# Patient Record
Sex: MALE | Race: OTHER | HISPANIC OR LATINO
[De-identification: names, ages, dates, MRNs, and addresses within clinical notes are randomized per-mention and may not be internally consistent; named-entity substitution may affect disease eponyms.]

---

## 2017-01-24 ENCOUNTER — FORM ENCOUNTER (OUTPATIENT)
Age: 52
End: 2017-01-24

## 2017-01-25 ENCOUNTER — OUTPATIENT (OUTPATIENT)
Dept: OUTPATIENT SERVICES | Facility: HOSPITAL | Age: 52
LOS: 1 days | End: 2017-01-25
Payer: MEDICAID

## 2017-01-25 ENCOUNTER — APPOINTMENT (OUTPATIENT)
Dept: RADIOLOGY | Facility: CLINIC | Age: 52
End: 2017-01-25

## 2017-01-25 DIAGNOSIS — M25.511 PAIN IN RIGHT SHOULDER: ICD-10-CM

## 2017-01-25 PROCEDURE — 73030 X-RAY EXAM OF SHOULDER: CPT

## 2017-01-30 ENCOUNTER — APPOINTMENT (OUTPATIENT)
Dept: FAMILY MEDICINE | Facility: CLINIC | Age: 52
End: 2017-01-30

## 2017-01-30 VITALS
SYSTOLIC BLOOD PRESSURE: 129 MMHG | TEMPERATURE: 97.8 F | HEIGHT: 67 IN | HEART RATE: 73 BPM | DIASTOLIC BLOOD PRESSURE: 75 MMHG | BODY MASS INDEX: 25.58 KG/M2 | OXYGEN SATURATION: 97 % | WEIGHT: 163 LBS

## 2017-01-30 DIAGNOSIS — Z78.9 OTHER SPECIFIED HEALTH STATUS: ICD-10-CM

## 2017-02-05 ENCOUNTER — FORM ENCOUNTER (OUTPATIENT)
Age: 52
End: 2017-02-05

## 2017-02-06 ENCOUNTER — OUTPATIENT (OUTPATIENT)
Dept: OUTPATIENT SERVICES | Facility: HOSPITAL | Age: 52
LOS: 1 days | End: 2017-02-06
Payer: MEDICAID

## 2017-02-06 ENCOUNTER — APPOINTMENT (OUTPATIENT)
Dept: MRI IMAGING | Facility: CLINIC | Age: 52
End: 2017-02-06

## 2017-02-06 DIAGNOSIS — M25.511 PAIN IN RIGHT SHOULDER: ICD-10-CM

## 2017-02-06 PROCEDURE — 73221 MRI JOINT UPR EXTREM W/O DYE: CPT

## 2017-05-02 ENCOUNTER — APPOINTMENT (OUTPATIENT)
Dept: FAMILY MEDICINE | Facility: CLINIC | Age: 52
End: 2017-05-02

## 2017-05-02 VITALS
SYSTOLIC BLOOD PRESSURE: 144 MMHG | HEIGHT: 67 IN | HEART RATE: 75 BPM | DIASTOLIC BLOOD PRESSURE: 80 MMHG | TEMPERATURE: 97.9 F | BODY MASS INDEX: 25.74 KG/M2 | WEIGHT: 164 LBS | OXYGEN SATURATION: 97 %

## 2017-08-02 ENCOUNTER — APPOINTMENT (OUTPATIENT)
Dept: FAMILY MEDICINE | Facility: CLINIC | Age: 52
End: 2017-08-02

## 2017-08-09 ENCOUNTER — APPOINTMENT (OUTPATIENT)
Dept: FAMILY MEDICINE | Facility: CLINIC | Age: 52
End: 2017-08-09
Payer: MEDICAID

## 2017-08-09 VITALS
HEART RATE: 71 BPM | DIASTOLIC BLOOD PRESSURE: 82 MMHG | WEIGHT: 165 LBS | BODY MASS INDEX: 25.9 KG/M2 | HEIGHT: 67 IN | OXYGEN SATURATION: 96 % | TEMPERATURE: 97.6 F | SYSTOLIC BLOOD PRESSURE: 136 MMHG

## 2017-08-09 DIAGNOSIS — R74.8 ABNORMAL LEVELS OF OTHER SERUM ENZYMES: ICD-10-CM

## 2017-08-09 DIAGNOSIS — Z87.898 PERSONAL HISTORY OF OTHER SPECIFIED CONDITIONS: ICD-10-CM

## 2017-08-09 DIAGNOSIS — E11.65 TYPE 2 DIABETES MELLITUS WITH HYPERGLYCEMIA: ICD-10-CM

## 2017-08-09 DIAGNOSIS — D72.819 DECREASED WHITE BLOOD CELL COUNT, UNSPECIFIED: ICD-10-CM

## 2017-08-09 PROCEDURE — 99214 OFFICE O/P EST MOD 30 MIN: CPT

## 2017-12-11 ENCOUNTER — APPOINTMENT (OUTPATIENT)
Dept: FAMILY MEDICINE | Facility: CLINIC | Age: 52
End: 2017-12-11
Payer: MEDICAID

## 2017-12-11 VITALS
WEIGHT: 161 LBS | BODY MASS INDEX: 25.27 KG/M2 | OXYGEN SATURATION: 97 % | DIASTOLIC BLOOD PRESSURE: 74 MMHG | HEART RATE: 71 BPM | HEIGHT: 67 IN | SYSTOLIC BLOOD PRESSURE: 166 MMHG | TEMPERATURE: 97.9 F

## 2017-12-11 VITALS — DIASTOLIC BLOOD PRESSURE: 90 MMHG | SYSTOLIC BLOOD PRESSURE: 160 MMHG

## 2017-12-11 DIAGNOSIS — Z87.09 PERSONAL HISTORY OF OTHER DISEASES OF THE RESPIRATORY SYSTEM: ICD-10-CM

## 2017-12-11 DIAGNOSIS — Z86.39 PERSONAL HISTORY OF OTHER ENDOCRINE, NUTRITIONAL AND METABOLIC DISEASE: ICD-10-CM

## 2017-12-11 DIAGNOSIS — H61.22 IMPACTED CERUMEN, LEFT EAR: ICD-10-CM

## 2017-12-11 DIAGNOSIS — I10 ESSENTIAL (PRIMARY) HYPERTENSION: ICD-10-CM

## 2017-12-11 DIAGNOSIS — N52.9 MALE ERECTILE DYSFUNCTION, UNSPECIFIED: ICD-10-CM

## 2017-12-11 PROCEDURE — 93000 ELECTROCARDIOGRAM COMPLETE: CPT

## 2017-12-11 PROCEDURE — 99396 PREV VISIT EST AGE 40-64: CPT | Mod: 25

## 2017-12-11 PROCEDURE — 36415 COLL VENOUS BLD VENIPUNCTURE: CPT

## 2017-12-11 PROCEDURE — 99213 OFFICE O/P EST LOW 20 MIN: CPT | Mod: 25

## 2017-12-11 RX ORDER — RAMIPRIL 1.25 MG/1
1.25 CAPSULE ORAL DAILY
Qty: 90 | Refills: 1 | Status: DISCONTINUED | COMMUNITY
Start: 2017-05-02 | End: 2017-12-11

## 2017-12-17 ENCOUNTER — RX RENEWAL (OUTPATIENT)
Age: 52
End: 2017-12-17

## 2017-12-17 ENCOUNTER — OTHER (OUTPATIENT)
Age: 52
End: 2017-12-17

## 2017-12-17 LAB
25(OH)D3 SERPL-MCNC: 21.3 NG/ML
ALBUMIN SERPL ELPH-MCNC: 4.5 G/DL
ALP BLD-CCNC: 70 U/L
ALT SERPL-CCNC: 69 U/L
ANION GAP SERPL CALC-SCNC: 10 MMOL/L
APPEARANCE: CLEAR
AST SERPL-CCNC: 39 U/L
BACTERIA: NEGATIVE
BASOPHILS # BLD AUTO: 0.05 K/UL
BASOPHILS NFR BLD AUTO: 0.5 %
BILIRUB SERPL-MCNC: 0.4 MG/DL
BILIRUBIN URINE: NEGATIVE
BLOOD URINE: NEGATIVE
BUN SERPL-MCNC: 18 MG/DL
CALCIUM SERPL-MCNC: 9.5 MG/DL
CHLORIDE SERPL-SCNC: 101 MMOL/L
CHOLEST SERPL-MCNC: 141 MG/DL
CHOLEST/HDLC SERPL: 4 RATIO
CO2 SERPL-SCNC: 29 MMOL/L
COLOR: YELLOW
CREAT SERPL-MCNC: 1.17 MG/DL
CREAT SPEC-SCNC: 69 MG/DL
EOSINOPHIL # BLD AUTO: 0.9 K/UL
EOSINOPHIL NFR BLD AUTO: 8.6 %
GLUCOSE QUALITATIVE U: NEGATIVE MG/DL
GLUCOSE SERPL-MCNC: 139 MG/DL
HBA1C MFR BLD HPLC: 7.4 %
HCT VFR BLD CALC: 43.9 %
HDLC SERPL-MCNC: 35 MG/DL
HGB BLD-MCNC: 14.9 G/DL
HIV1+2 AB SPEC QL IA.RAPID: NONREACTIVE
IMM GRANULOCYTES NFR BLD AUTO: 0.2 %
KETONES URINE: NEGATIVE
LDLC SERPL CALC-MCNC: 67 MG/DL
LEUKOCYTE ESTERASE URINE: NEGATIVE
LYMPHOCYTES # BLD AUTO: 3.27 K/UL
LYMPHOCYTES NFR BLD AUTO: 31.1 %
MAN DIFF?: NORMAL
MCHC RBC-ENTMCNC: 28.7 PG
MCHC RBC-ENTMCNC: 33.9 GM/DL
MCV RBC AUTO: 84.6 FL
MICROALBUMIN 24H UR DL<=1MG/L-MCNC: 12.2 MG/DL
MICROALBUMIN/CREAT 24H UR-RTO: 177 MG/G
MICROSCOPIC-UA: NORMAL
MONOCYTES # BLD AUTO: 0.67 K/UL
MONOCYTES NFR BLD AUTO: 6.4 %
NEUTROPHILS # BLD AUTO: 5.61 K/UL
NEUTROPHILS NFR BLD AUTO: 53.2 %
NITRITE URINE: NEGATIVE
PH URINE: 5.5
PLATELET # BLD AUTO: 256 K/UL
POTASSIUM SERPL-SCNC: 5.1 MMOL/L
PROT SERPL-MCNC: 8.6 G/DL
PROTEIN URINE: ABNORMAL MG/DL
PSA SERPL-MCNC: 0.91 NG/ML
RBC # BLD: 5.19 M/UL
RBC # FLD: 12.2 %
RED BLOOD CELLS URINE: 0 /HPF
SODIUM SERPL-SCNC: 140 MMOL/L
SPECIFIC GRAVITY URINE: 1.01
SQUAMOUS EPITHELIAL CELLS: 0 /HPF
T4 FREE SERPL-MCNC: 1.4 NG/DL
TESTOST BND SERPL-MCNC: 5.8 PG/ML
TESTOST SERPL-MCNC: 297.3 NG/DL
TRIGL SERPL-MCNC: 193 MG/DL
TSH SERPL-ACNC: 1.21 UIU/ML
UROBILINOGEN URINE: NEGATIVE MG/DL
WBC # FLD AUTO: 10.52 K/UL
WHITE BLOOD CELLS URINE: 0 /HPF

## 2017-12-28 ENCOUNTER — CHART COPY (OUTPATIENT)
Age: 52
End: 2017-12-28

## 2018-01-30 ENCOUNTER — APPOINTMENT (OUTPATIENT)
Dept: FAMILY MEDICINE | Facility: CLINIC | Age: 53
End: 2018-01-30
Payer: MEDICAID

## 2018-01-30 VITALS
OXYGEN SATURATION: 96 % | HEIGHT: 67 IN | SYSTOLIC BLOOD PRESSURE: 151 MMHG | WEIGHT: 165 LBS | BODY MASS INDEX: 25.9 KG/M2 | HEART RATE: 68 BPM | TEMPERATURE: 97.6 F | DIASTOLIC BLOOD PRESSURE: 78 MMHG

## 2018-01-30 VITALS — DIASTOLIC BLOOD PRESSURE: 80 MMHG | SYSTOLIC BLOOD PRESSURE: 120 MMHG

## 2018-01-30 DIAGNOSIS — Z86.39 PERSONAL HISTORY OF OTHER ENDOCRINE, NUTRITIONAL AND METABOLIC DISEASE: ICD-10-CM

## 2018-01-30 PROCEDURE — 99214 OFFICE O/P EST MOD 30 MIN: CPT

## 2018-01-30 RX ORDER — AMLODIPINE BESYLATE 5 MG/1
5 TABLET ORAL DAILY
Qty: 30 | Refills: 5 | Status: DISCONTINUED | COMMUNITY
Start: 2017-12-11 | End: 2018-01-30

## 2018-01-31 PROBLEM — Z86.39 HISTORY OF DIABETES MELLITUS: Status: RESOLVED | Noted: 2017-08-09 | Resolved: 2018-01-31

## 2018-02-02 ENCOUNTER — APPOINTMENT (OUTPATIENT)
Dept: CARDIOLOGY | Facility: CLINIC | Age: 53
End: 2018-02-02
Payer: MEDICAID

## 2018-02-02 VITALS
HEIGHT: 67 IN | BODY MASS INDEX: 25.27 KG/M2 | WEIGHT: 161 LBS | SYSTOLIC BLOOD PRESSURE: 128 MMHG | HEART RATE: 60 BPM | DIASTOLIC BLOOD PRESSURE: 86 MMHG

## 2018-02-02 DIAGNOSIS — Z86.79 PERSONAL HISTORY OF OTHER DISEASES OF THE CIRCULATORY SYSTEM: ICD-10-CM

## 2018-02-02 PROCEDURE — 99204 OFFICE O/P NEW MOD 45 MIN: CPT

## 2018-02-02 PROCEDURE — 93000 ELECTROCARDIOGRAM COMPLETE: CPT

## 2018-02-02 RX ORDER — NAPROXEN 375 MG/1
375 TABLET ORAL
Qty: 60 | Refills: 0 | Status: DISCONTINUED | COMMUNITY
Start: 2017-01-30 | End: 2018-02-02

## 2018-02-02 RX ORDER — MONTELUKAST 10 MG/1
10 TABLET, FILM COATED ORAL
Qty: 90 | Refills: 1 | Status: DISCONTINUED | COMMUNITY
Start: 2017-05-02 | End: 2018-02-02

## 2018-02-07 ENCOUNTER — APPOINTMENT (OUTPATIENT)
Dept: OPHTHALMOLOGY | Facility: CLINIC | Age: 53
End: 2018-02-07

## 2018-02-13 ENCOUNTER — APPOINTMENT (OUTPATIENT)
Dept: CARDIOLOGY | Facility: CLINIC | Age: 53
End: 2018-02-13
Payer: MEDICAID

## 2018-02-13 PROCEDURE — A9502: CPT

## 2018-02-13 PROCEDURE — 93306 TTE W/DOPPLER COMPLETE: CPT

## 2018-02-13 PROCEDURE — 78452 HT MUSCLE IMAGE SPECT MULT: CPT

## 2018-02-13 PROCEDURE — 93015 CV STRESS TEST SUPVJ I&R: CPT

## 2018-02-16 ENCOUNTER — APPOINTMENT (OUTPATIENT)
Dept: CARDIOLOGY | Facility: CLINIC | Age: 53
End: 2018-02-16
Payer: MEDICAID

## 2018-02-16 VITALS
HEART RATE: 70 BPM | WEIGHT: 162 LBS | BODY MASS INDEX: 25.43 KG/M2 | HEIGHT: 67 IN | SYSTOLIC BLOOD PRESSURE: 108 MMHG | OXYGEN SATURATION: 98 % | RESPIRATION RATE: 16 BRPM | DIASTOLIC BLOOD PRESSURE: 72 MMHG

## 2018-02-16 PROCEDURE — 99214 OFFICE O/P EST MOD 30 MIN: CPT

## 2018-04-02 ENCOUNTER — APPOINTMENT (OUTPATIENT)
Dept: INTERNAL MEDICINE | Facility: CLINIC | Age: 53
End: 2018-04-02

## 2018-07-22 ENCOUNTER — RX RENEWAL (OUTPATIENT)
Age: 53
End: 2018-07-22

## 2018-10-17 ENCOUNTER — RX RENEWAL (OUTPATIENT)
Age: 53
End: 2018-10-17

## 2018-11-08 ENCOUNTER — APPOINTMENT (OUTPATIENT)
Dept: INTERNAL MEDICINE | Facility: CLINIC | Age: 53
End: 2018-11-08

## 2018-11-30 ENCOUNTER — APPOINTMENT (OUTPATIENT)
Dept: INTERNAL MEDICINE | Facility: CLINIC | Age: 53
End: 2018-11-30
Payer: MEDICAID

## 2018-11-30 VITALS
SYSTOLIC BLOOD PRESSURE: 122 MMHG | TEMPERATURE: 97.7 F | OXYGEN SATURATION: 98 % | WEIGHT: 160 LBS | BODY MASS INDEX: 25.11 KG/M2 | HEART RATE: 64 BPM | DIASTOLIC BLOOD PRESSURE: 78 MMHG | HEIGHT: 67 IN

## 2018-11-30 DIAGNOSIS — R68.82 DECREASED LIBIDO: ICD-10-CM

## 2018-11-30 DIAGNOSIS — H10.10 ACUTE ATOPIC CONJUNCTIVITIS, UNSPECIFIED EYE: ICD-10-CM

## 2018-11-30 DIAGNOSIS — Z87.898 PERSONAL HISTORY OF OTHER SPECIFIED CONDITIONS: ICD-10-CM

## 2018-11-30 DIAGNOSIS — R39.12 POOR URINARY STREAM: ICD-10-CM

## 2018-11-30 DIAGNOSIS — R80.9 PROTEINURIA, UNSPECIFIED: ICD-10-CM

## 2018-11-30 DIAGNOSIS — M25.511 PAIN IN RIGHT SHOULDER: ICD-10-CM

## 2018-11-30 DIAGNOSIS — Z87.19 PERSONAL HISTORY OF OTHER DISEASES OF THE DIGESTIVE SYSTEM: ICD-10-CM

## 2018-11-30 DIAGNOSIS — Z87.09 PERSONAL HISTORY OF OTHER DISEASES OF THE RESPIRATORY SYSTEM: ICD-10-CM

## 2018-11-30 DIAGNOSIS — Z23 ENCOUNTER FOR IMMUNIZATION: ICD-10-CM

## 2018-11-30 PROCEDURE — 90715 TDAP VACCINE 7 YRS/> IM: CPT

## 2018-11-30 PROCEDURE — 99214 OFFICE O/P EST MOD 30 MIN: CPT | Mod: 25

## 2018-11-30 PROCEDURE — 90471 IMMUNIZATION ADMIN: CPT

## 2018-11-30 RX ORDER — LOSARTAN POTASSIUM 25 MG/1
25 TABLET, FILM COATED ORAL DAILY
Qty: 30 | Refills: 5 | Status: DISCONTINUED | COMMUNITY
Start: 2018-01-30 | End: 2018-11-30

## 2018-11-30 RX ORDER — MULTIVIT-MIN/FOLIC/VIT K/LYCOP 400-300MCG
25 MCG TABLET ORAL DAILY
Qty: 90 | Refills: 1 | Status: DISCONTINUED | COMMUNITY
Start: 2017-12-17 | End: 2018-11-30

## 2018-11-30 RX ORDER — OMEPRAZOLE 20 MG/1
20 CAPSULE, DELAYED RELEASE ORAL
Qty: 30 | Refills: 3 | Status: DISCONTINUED | COMMUNITY
Start: 2018-07-22 | End: 2018-11-30

## 2018-11-30 NOTE — HISTORY OF PRESENT ILLNESS
[de-identified] : Here for follow up\par \par He stopped taking atorvastatin.  He states it made him feel weak and not normal\par \par He stopped taking his losartan as well\par \par FS this morning at home 132.  He is only taking metformin 1000mg Po daily\par \par He states he feels well and denies any compalints

## 2018-11-30 NOTE — PHYSICAL EXAM
[General Appearance - Alert] : alert [General Appearance - In No Acute Distress] : in no acute distress [Sclera] : the sclera and conjunctiva were normal [PERRL With Normal Accommodation] : pupils were equal in size, round, and reactive to light [Oropharynx] : the oropharynx was normal [Neck Appearance] : the appearance of the neck was normal [Jugular Venous Distention Increased] : there was no jugular-venous distention [Auscultation Breath Sounds / Voice Sounds] : lungs were clear to auscultation bilaterally [Heart Rate And Rhythm] : heart rate was normal and rhythm regular [Heart Sounds] : normal S1 and S2 [Heart Sounds Gallop] : no gallops [Murmurs] : no murmurs [Heart Sounds Pericardial Friction Rub] : no pericardial rub [Edema] : there was no peripheral edema [Bowel Sounds] : normal bowel sounds [Abdomen Soft] : soft [Abdomen Tenderness] : non-tender [Abdomen Mass (___ Cm)] : no abdominal mass palpated [Musculoskeletal - Swelling] : no joint swelling seen [] : no rash [No Focal Deficits] : no focal deficits [Oriented To Time, Place, And Person] : oriented to person, place, and time [Affect] : the affect was normal [Mood] : the mood was normal [Arterial Pulses Carotid] : carotid pulses were normal with no bruits [FreeTextEntry1] : no calf tenderness [Right Foot Was Examined] : right foot was examined [Left Foot Was Examined] : left foot was examined [Normal Appearance] : normal in appearance [Tenderness] : not tender [Erythema] : not erythematous [Normal in Appearance] : normal in appearance [Swelling] : not swollen [Diminished Throughout Right Foot] : normal tactile sensation with monofilament testing throughout right foot [Diminished Throughout Left Foot] : normal tactile sensation with monofilament testing throughout left foot [Cranial Nerves] : cranial nerves 2-12 were intact [Motor Exam] : the motor exam was normal

## 2018-11-30 NOTE — ASSESSMENT
[FreeTextEntry1] : \par DM type 2:-HgA1c  7.4 2017-will check fasting labs\par -on metformin 1000mg PO daily\par --I adv low fat/low cholesterol diet\par -urine A:C 177-he could not tolerate ACEI because it made him a little dizzy-he had been on losaratn but he self d/c it.  Will check A:C\par -Opthalmology-2018 + DM retinopathy\par -Podiatry-saw 10/2016\par -Foot exam: 2018\par -referred to Endocrine but he never went\par \par Glaucoma/ DM retinopathy:\par -followed by ophthalmology\par \par Dyslipidemia:\par -he self d/c statin\par -benefits of statin discussed with pt at length\par -I adv low far/low cholesterol diet\par -fasting labs ordered\par \par HTN:\par -BP at goal today on no medication\par \par Vitamin D deficiency:\par -will check vitamin D 25-OH\par \par Elevated LFTS:\par -he has fatty liver\par -will check labs\par \par GERD:\par -he is asymptomatic off meds\par \par HCM:\par \par CPE 2017\par \par Flu shot: patient refused 2018\par \par Pneumovax: pt refused 10/11/2016\par \par Tdap: advised.  R/B discussed.  VIS given.  Tdap given today 2018\par \par HIV testing: negative 2017-offered today 2018 he declined\par \par Hep C screening: negative 2014\par \par hep B: not immune-advised immunization previously\par \par PSA 0.91 2017-will check PSA\par \par Depression screenin2018 negative (PHQ 2 score 0)\par \par Colonoscopy: he states he had colonoscopy done in  and it was normal\par \par \par F/U 3 months  fasting labs ordered\par

## 2018-11-30 NOTE — REVIEW OF SYSTEMS
[Negative] : Psychiatric [Fever] : no fever [Chills] : no chills [Feeling Poorly] : not feeling poorly [Feeling Tired] : not feeling tired [Recent Weight Loss (___ Lbs)] : no recent weight loss [Chest Pain] : no chest pain [Palpitations] : no palpitations [Lower Ext Edema] : no extremity edema [Shortness Of Breath] : no shortness of breath [Cough] : no cough [Wheezing] : no wheezing [SOB on Exertion] : no shortness of breath during exertion [Abdominal Pain] : no abdominal pain [Vomiting] : no vomiting [Constipation] : no constipation [Diarrhea] : no diarrhea [Heartburn] : no heartburn [Dizziness] : no dizziness

## 2019-01-27 ENCOUNTER — RX RENEWAL (OUTPATIENT)
Age: 54
End: 2019-01-27

## 2019-02-15 ENCOUNTER — APPOINTMENT (OUTPATIENT)
Dept: CARDIOLOGY | Facility: CLINIC | Age: 54
End: 2019-02-15

## 2019-02-26 ENCOUNTER — APPOINTMENT (OUTPATIENT)
Dept: INTERNAL MEDICINE | Facility: CLINIC | Age: 54
End: 2019-02-26
Payer: MEDICAID

## 2019-02-26 VITALS
OXYGEN SATURATION: 98 % | WEIGHT: 162 LBS | BODY MASS INDEX: 25.43 KG/M2 | TEMPERATURE: 97.5 F | SYSTOLIC BLOOD PRESSURE: 124 MMHG | DIASTOLIC BLOOD PRESSURE: 74 MMHG | HEART RATE: 73 BPM | HEIGHT: 67 IN | RESPIRATION RATE: 15 BRPM

## 2019-02-26 LAB
GLUCOSE BLDC GLUCOMTR-MCNC: 357
HBA1C MFR BLD HPLC: 9.5

## 2019-02-26 PROCEDURE — 36415 COLL VENOUS BLD VENIPUNCTURE: CPT

## 2019-02-26 PROCEDURE — 82962 GLUCOSE BLOOD TEST: CPT

## 2019-02-26 PROCEDURE — 83036 HEMOGLOBIN GLYCOSYLATED A1C: CPT | Mod: QW

## 2019-02-26 PROCEDURE — 99214 OFFICE O/P EST MOD 30 MIN: CPT | Mod: 25

## 2019-02-26 NOTE — REVIEW OF SYSTEMS
[Negative] : Genitourinary [Fever] : no fever [Chills] : no chills [Feeling Poorly] : not feeling poorly [Feeling Tired] : not feeling tired [Recent Weight Loss (___ Lbs)] : no recent weight loss [Eyesight Problems] : no eyesight problems [Chest Pain] : no chest pain [Palpitations] : no palpitations [Lower Ext Edema] : no extremity edema [Shortness Of Breath] : no shortness of breath [Cough] : no cough [Wheezing] : no wheezing [SOB on Exertion] : no shortness of breath during exertion [Abdominal Pain] : no abdominal pain [Vomiting] : no vomiting [Constipation] : no constipation [Diarrhea] : no diarrhea [Heartburn] : no heartburn [Dizziness] : no dizziness

## 2019-02-26 NOTE — HISTORY OF PRESENT ILLNESS
[de-identified] : Here for follow up of DM\par \par He states he feels well and denies any complaints

## 2019-02-26 NOTE — ASSESSMENT
[FreeTextEntry1] : \par DM type 2: uncontrolled\par -FS today 357 2 hours after eating chicken with rice\par -HgA1c 2019 9.5\par -he admits to non compliance with diet\par -on metformin 1000mg PO twice daily\par -I Will add januvia 100mg Po daily  (R/B/A/side effects discussed)-no hx of pancreatitis and no family or personal hx of thyroid cancer\par -I adv low fat/low cholesterol diet and low carb\par -urine A:C 1090 2018-he could not tolerate ACEI/RONALD because it made him a little dizzy  Will check A:C\par -Opthalmology-2018 + DM retinopathy\par -Podiatry-saw 10/2016\par -Foot exam: 2018\par -referred to Endocrine again today-he states he needs Endodrine in the [facility]-will refer to Dr Kruger\par -he is to check FS BID and call me in one week to discus numbers\par \par Glaucoma/ DM retinopathy:\par -followed by ophthalmology\par \par Dyslipidemia:\par -on atorvastatin\par -will check lipids at next visit as he is not fasting\par -I adv low far/low cholesterol diet\par \par HTN:\par -BP at goal today on no medication\par \par Vitamin D deficiency:\par -will check vitamin D 25-OH\par \par Elevated LFTS:\par -he has fatty liver\par -will check CMP\par \par Low testosterone:\par -will repeat labs and check prolactin, LH/FSH\par \par \par HCM:\par \par CPE 2017\par \par Flu shot: patient refused 2018\par \par Pneumovax: pt refused 10/11/2016\par \par Tdap: 2018\par \par HIV testing: negative 2017-offered 2018 he declined\par \par Hep C screening: negative 2014\par \par hep B: not immune-advised immunization previously\par \par PSA 0.62 2018\par \par Depression screenin2018 negative (PHQ 2 score 0)\par \par Colonoscopy: he states he had colonoscopy done in 2016 and it was normal\par \par \par F/U 3 months.  Labs drawn in office today.  Will refer to Care management\par

## 2019-03-03 LAB
25(OH)D3 SERPL-MCNC: 26.1 NG/ML
ALBUMIN SERPL ELPH-MCNC: 4.1 G/DL
ALP BLD-CCNC: 77 U/L
ALT SERPL-CCNC: 52 U/L
ANION GAP SERPL CALC-SCNC: 11 MMOL/L
APPEARANCE: CLEAR
AST SERPL-CCNC: 32 U/L
BACTERIA: NEGATIVE
BASOPHILS # BLD AUTO: 0.04 K/UL
BASOPHILS NFR BLD AUTO: 0.5 %
BILIRUB SERPL-MCNC: 0.3 MG/DL
BILIRUBIN URINE: NEGATIVE
BLOOD URINE: NEGATIVE
BUN SERPL-MCNC: 18 MG/DL
CALCIUM SERPL-MCNC: 9.4 MG/DL
CHLORIDE SERPL-SCNC: 102 MMOL/L
CO2 SERPL-SCNC: 26 MMOL/L
COLOR: NORMAL
CREAT SERPL-MCNC: 1.02 MG/DL
CREAT SPEC-SCNC: 102 MG/DL
EOSINOPHIL # BLD AUTO: 0.66 K/UL
EOSINOPHIL NFR BLD AUTO: 7.8 %
FSH SERPL-MCNC: 6.2 IU/L
GLUCOSE QUALITATIVE U: ABNORMAL
GLUCOSE SERPL-MCNC: 309 MG/DL
HCT VFR BLD CALC: 44.7 %
HGB BLD-MCNC: 14.9 G/DL
HYALINE CASTS: 0 /LPF
IMM GRANULOCYTES NFR BLD AUTO: 0.1 %
KETONES URINE: NEGATIVE
LEUKOCYTE ESTERASE URINE: NEGATIVE
LH SERPL-ACNC: 5.5 IU/L
LYMPHOCYTES # BLD AUTO: 2.49 K/UL
LYMPHOCYTES NFR BLD AUTO: 29.4 %
MAN DIFF?: NORMAL
MCHC RBC-ENTMCNC: 28 PG
MCHC RBC-ENTMCNC: 33.3 GM/DL
MCV RBC AUTO: 84 FL
MICROALBUMIN 24H UR DL<=1MG/L-MCNC: 83.4 MG/DL
MICROALBUMIN/CREAT 24H UR-RTO: 817 MG/G
MICROSCOPIC-UA: NORMAL
MONOCYTES # BLD AUTO: 0.48 K/UL
MONOCYTES NFR BLD AUTO: 5.7 %
NEUTROPHILS # BLD AUTO: 4.78 K/UL
NEUTROPHILS NFR BLD AUTO: 56.5 %
NITRITE URINE: NEGATIVE
PH URINE: 6.5
PLATELET # BLD AUTO: 265 K/UL
POTASSIUM SERPL-SCNC: 4.9 MMOL/L
PROLACTIN SERPL-MCNC: 5.4 NG/ML
PROT SERPL-MCNC: 7.3 G/DL
PROTEIN URINE: ABNORMAL
RBC # BLD: 5.32 M/UL
RBC # FLD: 11.9 %
RED BLOOD CELLS URINE: 1 /HPF
SODIUM SERPL-SCNC: 139 MMOL/L
SPECIFIC GRAVITY URINE: 1.03
SQUAMOUS EPITHELIAL CELLS: 0 /HPF
TESTOST BND SERPL-MCNC: 3 PG/ML
TESTOST SERPL-MCNC: 260 NG/DL
UROBILINOGEN URINE: NORMAL
WBC # FLD AUTO: 8.46 K/UL
WHITE BLOOD CELLS URINE: 0 /HPF

## 2019-03-03 RX ORDER — ERGOCALCIFEROL 1.25 MG/1
1.25 MG CAPSULE, LIQUID FILLED ORAL
Qty: 12 | Refills: 0 | Status: DISCONTINUED | COMMUNITY
Start: 2019-01-21 | End: 2019-03-03

## 2019-05-21 ENCOUNTER — APPOINTMENT (OUTPATIENT)
Dept: INTERNAL MEDICINE | Facility: CLINIC | Age: 54
End: 2019-05-21

## 2019-06-08 ENCOUNTER — RX RENEWAL (OUTPATIENT)
Age: 54
End: 2019-06-08

## 2019-07-15 ENCOUNTER — APPOINTMENT (OUTPATIENT)
Dept: INTERNAL MEDICINE | Facility: CLINIC | Age: 54
End: 2019-07-15

## 2019-09-14 ENCOUNTER — RX RENEWAL (OUTPATIENT)
Age: 54
End: 2019-09-14

## 2019-10-18 ENCOUNTER — APPOINTMENT (OUTPATIENT)
Dept: INTERNAL MEDICINE | Facility: CLINIC | Age: 54
End: 2019-10-18

## 2019-10-20 ENCOUNTER — RX RENEWAL (OUTPATIENT)
Age: 54
End: 2019-10-20

## 2019-11-07 ENCOUNTER — APPOINTMENT (OUTPATIENT)
Dept: INTERNAL MEDICINE | Facility: CLINIC | Age: 54
End: 2019-11-07
Payer: COMMERCIAL

## 2019-11-07 VITALS
TEMPERATURE: 97.4 F | BODY MASS INDEX: 25.58 KG/M2 | WEIGHT: 163 LBS | HEART RATE: 81 BPM | DIASTOLIC BLOOD PRESSURE: 78 MMHG | OXYGEN SATURATION: 99 % | RESPIRATION RATE: 15 BRPM | SYSTOLIC BLOOD PRESSURE: 130 MMHG | HEIGHT: 67 IN

## 2019-11-07 DIAGNOSIS — E78.1 PURE HYPERGLYCERIDEMIA: ICD-10-CM

## 2019-11-07 DIAGNOSIS — N40.0 BENIGN PROSTATIC HYPERPLASIA WITHOUT LOWER URINARY TRACT SYMPMS: ICD-10-CM

## 2019-11-07 LAB
GLUCOSE BLDC GLUCOMTR-MCNC: 110
HBA1C MFR BLD HPLC: 6.6

## 2019-11-07 PROCEDURE — 36415 COLL VENOUS BLD VENIPUNCTURE: CPT

## 2019-11-07 PROCEDURE — 83036 HEMOGLOBIN GLYCOSYLATED A1C: CPT | Mod: QW

## 2019-11-07 PROCEDURE — 96127 BRIEF EMOTIONAL/BEHAV ASSMT: CPT

## 2019-11-07 PROCEDURE — 99214 OFFICE O/P EST MOD 30 MIN: CPT | Mod: 25

## 2019-11-07 PROCEDURE — 82962 GLUCOSE BLOOD TEST: CPT

## 2019-11-07 NOTE — ADDENDUM
[FreeTextEntry1] : I, Shasta Kent, acted solely as a scribe for Dr. Miller on this date [11/7/2019].\par

## 2019-11-07 NOTE — PHYSICAL EXAM
[No Acute Distress] : no acute distress [Well-Appearing] : well-appearing [Normal Voice/Communication] : normal voice/communication [Normal Sclera/Conjunctiva] : normal sclera/conjunctiva [PERRL] : pupils equal round and reactive to light [Normal Oropharynx] : the oropharynx was normal [Normal] : no respiratory distress, lungs were clear to auscultation bilaterally and no accessory muscle use [No Edema] : there was no peripheral edema [No Extremity Clubbing/Cyanosis] : no extremity clubbing/cyanosis [Soft] : abdomen soft [Non Tender] : non-tender [Non-distended] : non-distended [No Masses] : no abdominal mass palpated [No HSM] : no HSM [No Rash] : no rash [Normal Bowel Sounds] : normal bowel sounds [No Skin Lesions] : no skin lesions [No Focal Deficits] : no focal deficits [Normal Affect] : the affect was normal [Alert and Oriented x3] : oriented to person, place, and time [Normal Mood] : the mood was normal [Normal Insight/Judgement] : insight and judgment were intact [Comprehensive Foot Exam Normal] : Right and left foot were examined and both feet are normal. No ulcers in either foot. Toes are normal and with full ROM.  Normal tactile sensation with monofilament testing throughout both feet [Well Nourished] : well nourished [Well Developed] : well developed [No Carotid Bruits] : no carotid bruits [No Spinal Tenderness] : no spinal tenderness [de-identified] : no calf tenderness [de-identified] : + 2 DP/PT pulses B/L

## 2019-11-07 NOTE — ASSESSMENT
[FreeTextEntry1] : \par DM type 2: \par -FS today 110 \par -HgA1c 6.6 2019\par -on metformin 1000mg PO once daily\par -he self d/c januvia\par -I adv low fat/low cholesterol diet and low carb\par -urine A:C-will check today-restart lisinopril 2.5mg PO daily\par -Opthalmology-2019\par -Foot exam: 2019\par -he was referred to Endocrine previously but he never went\par -FS at home in low 100s per pt\par \par Glaucoma/ DM retinopathy:\par -followed by ophthalmology-last seen 2019\par \par Dyslipidemia:\par -on atorvastatin-but has been out x 2 weeks\par -will check lipids\par \par HTN:\par -BP at goal today \par \par Vitamin D deficiency:\par -will check vitamin D 25-OH\par \par Elevated LFTS:\par -he has fatty liver\par -will check CMP\par \par Low testosterone:\par -will repeat labs\par -he was previously referred to Endocrine but he never went\par \par \par HCM:\par \par CPE 2017\par \par Flu shot: patient refused 2019\par \par Pneumovax: pt refused 10/11/2016\par \par Tdap: 2018\par \par HIV testing: negative 2017-offered and pt consented 2019\par \par Hep C screening: negative 2014\par \par hep B: not immune-advised immunization previously\par \par PSA 0.62 2018-will check PSA\par \par Depression screenin2019 negative (PHQ 2 score 0)\par \par Colonoscopy:  2016 normal per pt\par \par \par F/U 3 months.  Labs drawn in office today\par

## 2019-11-07 NOTE — HISTORY OF PRESENT ILLNESS
[FreeTextEntry1] : DM follow up  [de-identified] : Here for follow up of DM and medication refill. \par \par He states that he has been out of Atorvastatin and Lisinopril for 15 days. \par \par He notes that he has only been taking metformin once a day because he feels his sugars have been well controlled. He also notes that he has been drinking and natural root supplement (Coscowei from [place]) that he feels has been helping control his diabetes. His sugars in the am at home range around 101- 110. He has not been taking januvia either\par \par He states he feels well and denies any complaints.

## 2019-11-07 NOTE — REVIEW OF SYSTEMS
[Negative] : Neurological [Fever] : no fever [Chills] : no chills [Fatigue] : no fatigue [Recent Change In Weight] : ~T no recent weight change [Chest Pain] : no chest pain [Palpitations] : no palpitations [Lower Ext Edema] : no lower extremity edema [Abdominal Pain] : no abdominal pain [Nausea] : no nausea [Diarrhea] : no diarrhea [Vomiting] : no vomiting [Anxiety] : no anxiety [Depression] : no depression

## 2019-11-07 NOTE — END OF VISIT
[FreeTextEntry3] : All medical entries made by the Scribe were at my, Dr. Junaid Miller's, direction and personally dictated by me on [11/7/2019]. I have reviewed the chart and agree that the record accurately reflects my personal performance of the history, physical exam, assessment and plan. I have also personally directed, reviewed, and agreed with the chart.\par

## 2019-11-08 LAB
25(OH)D3 SERPL-MCNC: 20.8 NG/ML
ALBUMIN SERPL ELPH-MCNC: 4.4 G/DL
ALP BLD-CCNC: 68 U/L
ALT SERPL-CCNC: 30 U/L
ANION GAP SERPL CALC-SCNC: 14 MMOL/L
APPEARANCE: CLEAR
AST SERPL-CCNC: 25 U/L
BACTERIA: NEGATIVE
BASOPHILS # BLD AUTO: 0.05 K/UL
BASOPHILS NFR BLD AUTO: 0.5 %
BILIRUB SERPL-MCNC: 0.2 MG/DL
BILIRUBIN URINE: NEGATIVE
BLOOD URINE: NEGATIVE
BUN SERPL-MCNC: 16 MG/DL
CALCIUM SERPL-MCNC: 9.1 MG/DL
CHLORIDE SERPL-SCNC: 105 MMOL/L
CHOLEST SERPL-MCNC: 196 MG/DL
CHOLEST/HDLC SERPL: 5.9 RATIO
CO2 SERPL-SCNC: 22 MMOL/L
COLOR: NORMAL
CREAT SERPL-MCNC: 1.02 MG/DL
CREAT SPEC-SCNC: 102 MG/DL
EOSINOPHIL # BLD AUTO: 0.4 K/UL
EOSINOPHIL NFR BLD AUTO: 4.3 %
GLUCOSE QUALITATIVE U: NORMAL
GLUCOSE SERPL-MCNC: 111 MG/DL
HCT VFR BLD CALC: 44.5 %
HDLC SERPL-MCNC: 33 MG/DL
HGB BLD-MCNC: 14.5 G/DL
HIV1+2 AB SPEC QL IA.RAPID: NONREACTIVE
HYALINE CASTS: 1 /LPF
IMM GRANULOCYTES NFR BLD AUTO: 0.3 %
KETONES URINE: NEGATIVE
LDLC SERPL CALC-MCNC: NORMAL MG/DL
LEUKOCYTE ESTERASE URINE: NEGATIVE
LYMPHOCYTES # BLD AUTO: 3.58 K/UL
LYMPHOCYTES NFR BLD AUTO: 38.7 %
MAN DIFF?: NORMAL
MCHC RBC-ENTMCNC: 28.3 PG
MCHC RBC-ENTMCNC: 32.6 GM/DL
MCV RBC AUTO: 86.9 FL
MICROALBUMIN 24H UR DL<=1MG/L-MCNC: 66.7 MG/DL
MICROALBUMIN/CREAT 24H UR-RTO: 652 MG/G
MICROSCOPIC-UA: NORMAL
MONOCYTES # BLD AUTO: 0.58 K/UL
MONOCYTES NFR BLD AUTO: 6.3 %
NEUTROPHILS # BLD AUTO: 4.6 K/UL
NEUTROPHILS NFR BLD AUTO: 49.9 %
NITRITE URINE: NEGATIVE
PH URINE: 6
PLATELET # BLD AUTO: 291 K/UL
POTASSIUM SERPL-SCNC: 4.9 MMOL/L
PROT SERPL-MCNC: 7.3 G/DL
PROTEIN URINE: ABNORMAL
PSA SERPL-MCNC: 0.73 NG/ML
RBC # BLD: 5.12 M/UL
RBC # FLD: 12.2 %
RED BLOOD CELLS URINE: 0 /HPF
SODIUM SERPL-SCNC: 141 MMOL/L
SPECIFIC GRAVITY URINE: 1.01
SQUAMOUS EPITHELIAL CELLS: 0 /HPF
T4 FREE SERPL-MCNC: 1.1 NG/DL
TRIGL SERPL-MCNC: 442 MG/DL
TSH SERPL-ACNC: 1.65 UIU/ML
UROBILINOGEN URINE: NORMAL
WBC # FLD AUTO: 9.24 K/UL
WHITE BLOOD CELLS URINE: 0 /HPF

## 2019-11-11 ENCOUNTER — RESULT REVIEW (OUTPATIENT)
Age: 54
End: 2019-11-11

## 2020-02-06 ENCOUNTER — APPOINTMENT (OUTPATIENT)
Dept: INTERNAL MEDICINE | Facility: CLINIC | Age: 55
End: 2020-02-06
Payer: COMMERCIAL

## 2020-02-06 VITALS
HEIGHT: 67 IN | HEART RATE: 76 BPM | DIASTOLIC BLOOD PRESSURE: 70 MMHG | OXYGEN SATURATION: 98 % | RESPIRATION RATE: 15 BRPM | WEIGHT: 160 LBS | SYSTOLIC BLOOD PRESSURE: 116 MMHG | BODY MASS INDEX: 25.11 KG/M2 | TEMPERATURE: 97.6 F

## 2020-02-06 DIAGNOSIS — Z00.00 ENCOUNTER FOR GENERAL ADULT MEDICAL EXAMINATION W/OUT ABNORMAL FINDINGS: ICD-10-CM

## 2020-02-06 LAB
FLUAV SPEC QL CULT: NORMAL
FLUBV AG SPEC QL IA: NORMAL
GLUCOSE BLDC GLUCOMTR-MCNC: 336
HBA1C MFR BLD HPLC: 11.5

## 2020-02-06 PROCEDURE — 83036 HEMOGLOBIN GLYCOSYLATED A1C: CPT | Mod: QW

## 2020-02-06 PROCEDURE — 99214 OFFICE O/P EST MOD 30 MIN: CPT | Mod: 25

## 2020-02-06 PROCEDURE — 82962 GLUCOSE BLOOD TEST: CPT

## 2020-02-06 PROCEDURE — 87804 INFLUENZA ASSAY W/OPTIC: CPT | Mod: 59,QW

## 2020-02-06 PROCEDURE — 36415 COLL VENOUS BLD VENIPUNCTURE: CPT

## 2020-02-06 RX ORDER — SITAGLIPTIN 100 MG/1
100 TABLET, FILM COATED ORAL DAILY
Qty: 30 | Refills: 0 | Status: DISCONTINUED | COMMUNITY
Start: 2019-02-26 | End: 2020-02-06

## 2020-02-06 NOTE — HISTORY OF PRESENT ILLNESS
[de-identified] : Here for follow up of his diabetes\par \par He has not been checking FS at home regularly.  His glucose today in office was 336 today.  he states he ate cereal for breakfast and potatoes and pasta for lung.  he states he recently returns from [facility] and states had been non complaint with diet\par \par He states overall he has been feeling well although 2 days ago he started to develop cold symptoms.  he states he has had runny nose/congestion, fatigue/malaise, and dry cough.  he has not taken anything for sx.  he denies fever/chills, sore throat, ear pain, rash, abd pain, nausea, vomiting, diarrhea\par \par he was referral to see his cardiologist for follow up as he ha snot been there is 2 years.  He denies chest pain, sob, palpitations

## 2020-02-06 NOTE — ASSESSMENT
[FreeTextEntry1] : \par DM type 2: Uncontrolled\par -FS today 336 and HgA1c 11.5 likely due to dietary noncomplaince (previous HgA1c  was 6.6 2019)\par -will check labs\par -on metformin 1000mg PO BID\par -Will restart januvia 100mg Po daily  (R/B/A/side effects discussed)\par -I explained to him he may need 3rd agent possible insulin if glucose does not improve\par -I advised low fat/low cholesterol and low carb diet\par -urine A:C-65 2019- on lisinopril 2.5mg PO daily-will check A:C\par -Opthalmology-2019-referred today\par -Foot exam: 2019\par -he was referred to Endocrine previously but he never went-I again advised he see [facility]-will refer to Dr Ludwin buenrostro in [facility]\par -he is to start checking FS BID at home and bring FS long to office in 4 weeks\par -will check labs today\par \par Glaucoma/ DM retinopathy:\par -followed by ophthalmology-last seen 2019-will refer to ophthalmology\par \par Dyslipidemia:\par -on atorvastatin\par -will check lipids at next visit-he was not fasting today\par \par HTN:\par -BP at goal today \par \par Vitamin D deficiency:\par -I advised vitamin D3 1000 units daily\par \par Elevated LFTS:\par -last AST/ALT 2019 was normal\par -he has fatty liver\par -will check CMP\par \par Low testosterone:\par -will check testosterone\par \par Viral URI:\par -rapid flu test in office today was negative\par -I advised rest and fluids\par -no indication for antibiotics at this time\par -he is to notify office if sx persist or worsen\par \par HCM:\par \par CPE 2017\par \par Flu shot: patient refused 2019\par \par Pneumovax: pt refused 10/11/2016\par \par Tdap: 2018\par \par HIV testing: negative 2019\par \par Hep C screening: negative 2014\par \par hep B: not immune-advised immunization previously\par \par PSA 0.73 2019\par \par Depression screenin2019 negative (PHQ 2 score 0)\par \par Colonoscopy:  2016 normal per pt\par \par \par F/U 4 weeks. Labs drawn in office today\par

## 2020-02-06 NOTE — REVIEW OF SYSTEMS
[Fatigue] : fatigue [Nasal Discharge] : nasal discharge [Cough] : cough [Negative] : Genitourinary [Fever] : no fever [Chills] : no chills [Recent Change In Weight] : ~T no recent weight change [Earache] : no earache [Sore Throat] : no sore throat [Palpitations] : no palpitations [Chest Pain] : no chest pain [Lower Ext Edema] : no lower extremity edema [Shortness Of Breath] : no shortness of breath [Wheezing] : no wheezing [Dyspnea on Exertion] : not dyspnea on exertion [Abdominal Pain] : no abdominal pain [Nausea] : no nausea [Diarrhea] : no diarrhea [Vomiting] : no vomiting

## 2020-02-06 NOTE — PHYSICAL EXAM
[No Acute Distress] : no acute distress [Normal Voice/Communication] : normal voice/communication [Well-Appearing] : well-appearing [Normal Sclera/Conjunctiva] : normal sclera/conjunctiva [Normal Oropharynx] : the oropharynx was normal [PERRL] : pupils equal round and reactive to light [No Edema] : there was no peripheral edema [Normal] : normal rate, regular rhythm, normal S1 and S2 and no murmur heard [No Extremity Clubbing/Cyanosis] : no extremity clubbing/cyanosis [Soft] : abdomen soft [Non-distended] : non-distended [Non Tender] : non-tender [No Masses] : no abdominal mass palpated [No HSM] : no HSM [Normal Bowel Sounds] : normal bowel sounds [No Rash] : no rash [Normal Affect] : the affect was normal [No Focal Deficits] : no focal deficits [Normal Mood] : the mood was normal [Alert and Oriented x3] : oriented to person, place, and time [Normal Insight/Judgement] : insight and judgment were intact [Normal Outer Ear/Nose] : the outer ears and nose were normal in appearance [Normal TMs] : both tympanic membranes were normal [de-identified] : no calf tenderness [de-identified] : nasal mucosa is edematous and mildly erythemaotus

## 2020-02-09 LAB
ALBUMIN SERPL ELPH-MCNC: 4.3 G/DL
ALP BLD-CCNC: 77 U/L
ALT SERPL-CCNC: 46 U/L
ANION GAP SERPL CALC-SCNC: 15 MMOL/L
AST SERPL-CCNC: 29 U/L
BASOPHILS # BLD AUTO: 0.06 K/UL
BASOPHILS NFR BLD AUTO: 0.6 %
BILIRUB SERPL-MCNC: 0.3 MG/DL
BUN SERPL-MCNC: 22 MG/DL
CALCIUM SERPL-MCNC: 9.3 MG/DL
CHLORIDE SERPL-SCNC: 98 MMOL/L
CO2 SERPL-SCNC: 22 MMOL/L
CREAT SERPL-MCNC: 1.14 MG/DL
CREAT SPEC-SCNC: 120 MG/DL
EOSINOPHIL # BLD AUTO: 1.17 K/UL
EOSINOPHIL NFR BLD AUTO: 11.3 %
GLUCOSE SERPL-MCNC: 259 MG/DL
HCT VFR BLD CALC: 44.9 %
HGB BLD-MCNC: 15.1 G/DL
IMM GRANULOCYTES NFR BLD AUTO: 0.2 %
LYMPHOCYTES # BLD AUTO: 3.21 K/UL
LYMPHOCYTES NFR BLD AUTO: 31 %
MAN DIFF?: NORMAL
MCHC RBC-ENTMCNC: 28.5 PG
MCHC RBC-ENTMCNC: 33.6 GM/DL
MCV RBC AUTO: 84.9 FL
MICROALBUMIN 24H UR DL<=1MG/L-MCNC: 24.9 MG/DL
MICROALBUMIN/CREAT 24H UR-RTO: 207 MG/G
MONOCYTES # BLD AUTO: 0.58 K/UL
MONOCYTES NFR BLD AUTO: 5.6 %
NEUTROPHILS # BLD AUTO: 5.32 K/UL
NEUTROPHILS NFR BLD AUTO: 51.3 %
PLATELET # BLD AUTO: 264 K/UL
POTASSIUM SERPL-SCNC: 5.8 MMOL/L
PROT SERPL-MCNC: 7.1 G/DL
RBC # BLD: 5.29 M/UL
RBC # FLD: 11.7 %
SODIUM SERPL-SCNC: 135 MMOL/L
TESTOST SERPL-MCNC: 255 NG/DL
WBC # FLD AUTO: 10.36 K/UL

## 2020-03-12 ENCOUNTER — APPOINTMENT (OUTPATIENT)
Dept: INTERNAL MEDICINE | Facility: CLINIC | Age: 55
End: 2020-03-12
Payer: COMMERCIAL

## 2020-03-12 VITALS
HEART RATE: 81 BPM | RESPIRATION RATE: 15 BRPM | SYSTOLIC BLOOD PRESSURE: 144 MMHG | OXYGEN SATURATION: 97 % | TEMPERATURE: 97.9 F | DIASTOLIC BLOOD PRESSURE: 83 MMHG | WEIGHT: 160 LBS | HEIGHT: 67 IN | BODY MASS INDEX: 25.11 KG/M2

## 2020-03-12 DIAGNOSIS — E87.5 HYPERKALEMIA: ICD-10-CM

## 2020-03-12 DIAGNOSIS — J06.9 ACUTE UPPER RESPIRATORY INFECTION, UNSPECIFIED: ICD-10-CM

## 2020-03-12 PROCEDURE — 36415 COLL VENOUS BLD VENIPUNCTURE: CPT

## 2020-03-12 PROCEDURE — 99214 OFFICE O/P EST MOD 30 MIN: CPT | Mod: 25

## 2020-03-12 NOTE — PHYSICAL EXAM
[No Acute Distress] : no acute distress [Well-Appearing] : well-appearing [Normal Voice/Communication] : normal voice/communication [Normal Sclera/Conjunctiva] : normal sclera/conjunctiva [PERRL] : pupils equal round and reactive to light [Normal Oropharynx] : the oropharynx was normal [Normal] : normal rate, regular rhythm, normal S1 and S2 and no murmur heard [No Edema] : there was no peripheral edema [No Extremity Clubbing/Cyanosis] : no extremity clubbing/cyanosis [Soft] : abdomen soft [Non Tender] : non-tender [Non-distended] : non-distended [No Masses] : no abdominal mass palpated [No HSM] : no HSM [Normal Bowel Sounds] : normal bowel sounds [No Rash] : no rash [Normal Affect] : the affect was normal [Alert and Oriented x3] : oriented to person, place, and time [Normal Mood] : the mood was normal [Normal Insight/Judgement] : insight and judgment were intact

## 2020-03-14 PROBLEM — J06.9 ACUTE URI: Status: RESOLVED | Noted: 2020-02-06 | Resolved: 2020-03-14

## 2020-03-14 PROBLEM — E87.5 HYPERKALEMIA: Status: ACTIVE | Noted: 2020-02-20

## 2020-03-14 NOTE — ASSESSMENT
[FreeTextEntry1] : \par DM type 2: Uncontrolled\par -HgA1c 11.5 2020\par -FS at home appear to be improving\par -he is now taking  metformin 1000mg PO once daily and Januvia 100mg Po daily \par -I advised low fat/low cholesterol and low carb diet\par -urine A:C-207 2020- on lisinopril 2.5mg PO daily\par -Opthalmology-2020\par -Foot exam: 2019\par -he was referred to Endocrine and I again advised him to make appt\par \par Hyperkalemia:\par -mild\par -on ACEI\par -will repeat BMP-may need to stop ACEI if this persists\par \par Glaucoma/ DM retinopathy:\par -followed by ophthalmology-last seen approx 2020\par \par Dyslipidemia:\par -on atorvastatin\par -will check lipids \par \par HTN:\par -BP mildly elevated today but he reports he did not take his lisinopril today\par -I advised low fat/low cholesterol diet, low salt diet, and weight loss\par \par Vitamin D deficiency:\par -I advised vitamin D3 1000 units daily\par -will check vitamin D 25-OH\par \par Elevated LFTS:\par -ALT 46 2020\par -he has fatty liver\par -weight loss advised\par \par Low testosterone:\par -testosterone 255\par -advised he f/u with Endocrine\par \par HCM:\par \par CPE 2017\par \par Flu shot: patient refused 2019\par \par Pneumovax: pt refused 10/11/2016\par \par Tdap: 2018\par \par HIV testing: negative 2019\par \par Hep C screening: negative 2014\par \par hep B: not immune-advised immunization previously\par \par PSA 0.73 2019\par \par Depression screenin2019 negative (PHQ 2 score 0)\par \par Colonoscopy:  2016 normal per pt\par \par \par F/U 3 months. Labs drawn in office today\par

## 2020-03-14 NOTE — REVIEW OF SYSTEMS
[Negative] : Respiratory [Fever] : no fever [Chills] : no chills [Fatigue] : no fatigue [Chest Pain] : no chest pain [Palpitations] : no palpitations [Lower Ext Edema] : no lower extremity edema [Shortness Of Breath] : no shortness of breath [Wheezing] : no wheezing [Cough] : no cough [Dyspnea on Exertion] : not dyspnea on exertion [Abdominal Pain] : no abdominal pain [Nausea] : no nausea [Diarrhea] : no diarrhea [Vomiting] : no vomiting

## 2020-03-15 LAB
25(OH)D3 SERPL-MCNC: 31 NG/ML
ANION GAP SERPL CALC-SCNC: 16 MMOL/L
BUN SERPL-MCNC: 18 MG/DL
CALCIUM SERPL-MCNC: 9.8 MG/DL
CHLORIDE SERPL-SCNC: 103 MMOL/L
CHOLEST SERPL-MCNC: 135 MG/DL
CHOLEST/HDLC SERPL: 3.2 RATIO
CO2 SERPL-SCNC: 22 MMOL/L
CREAT SERPL-MCNC: 1.1 MG/DL
GLUCOSE SERPL-MCNC: 126 MG/DL
HDLC SERPL-MCNC: 42 MG/DL
LDLC SERPL CALC-MCNC: 52 MG/DL
POTASSIUM SERPL-SCNC: 5.5 MMOL/L
SODIUM SERPL-SCNC: 141 MMOL/L
TRIGL SERPL-MCNC: 203 MG/DL

## 2020-05-01 ENCOUNTER — RX RENEWAL (OUTPATIENT)
Age: 55
End: 2020-05-01

## 2020-07-28 ENCOUNTER — RX RENEWAL (OUTPATIENT)
Age: 55
End: 2020-07-28

## 2020-08-10 ENCOUNTER — APPOINTMENT (OUTPATIENT)
Dept: INTERNAL MEDICINE | Facility: CLINIC | Age: 55
End: 2020-08-10

## 2020-10-07 ENCOUNTER — APPOINTMENT (OUTPATIENT)
Dept: INTERNAL MEDICINE | Facility: CLINIC | Age: 55
End: 2020-10-07
Payer: MEDICAID

## 2020-10-07 VITALS
HEART RATE: 70 BPM | BODY MASS INDEX: 24.17 KG/M2 | TEMPERATURE: 98.4 F | RESPIRATION RATE: 15 BRPM | DIASTOLIC BLOOD PRESSURE: 70 MMHG | HEIGHT: 67 IN | OXYGEN SATURATION: 97 % | WEIGHT: 154 LBS | SYSTOLIC BLOOD PRESSURE: 124 MMHG

## 2020-10-07 DIAGNOSIS — E29.1 TESTICULAR HYPOFUNCTION: ICD-10-CM

## 2020-10-07 PROCEDURE — 96127 BRIEF EMOTIONAL/BEHAV ASSMT: CPT

## 2020-10-07 PROCEDURE — 99214 OFFICE O/P EST MOD 30 MIN: CPT | Mod: 25

## 2020-10-07 NOTE — ASSESSMENT
[FreeTextEntry1] : \par DM type 2: Uncontrolled\par -HgA1c 7.4 9/2020-now followed by Endocrine and he will make f/u appt\par -he is now taking  metformin 1000mg PO once daily \par -he is no longer on Januvia \par -I advised low fat/low cholesterol and low carb diet\par -urine A:C-297 9/2020- on lisinopril 2.5mg PO daily\par -Opthalmology-9/2020\par -Foot exam: 10/7/2020\par \par Hyperkalemia:\par -I prev adv him to stop lisinopril but he did not\par -recent K 9/2020 was normal\par \par Glaucoma/ DM retinopathy:\par -followed by ophthalmology-last seen approx 9/2020\par \par Dyslipidemia:\par -on atorvastatin\par -LDL at goal 9/2020, trig 203\par \par HTN:\par -BP at goal today\par \par Vitamin D deficiency:\par -will check vitamin D 25-OH with next labs\par \par Elevated LFTS:\par -9/2020 LFTs normal\par -he has fatty liver\par \par Low testosterone:\par -I have adv he discuss this with Endocrine at f/u visit\par \par HCM:\par \par CPE 12/11/2017\par \par Flu shot: patient refused 10/7/2020\par \par Pneumovax: pt refused 10/11/2016\par \par Tdap: 11/30/2018\par \par HIV testing: negative 11/2019-offered 10/7/2020-he declined\par \par Hep C screening: negative 12/2014\par \par Hep B: not immune-advised immunization previously\par \par PSA 0.73 11/2019-will check PSA\par \par Depression screening:10/7/2020 negative (PHQ 2 score 0)\par \par Colonoscopy:  2016 normal per pt\par \par \par F/U 3 months. Labs ordered and he will have done prior to next visit in 3 months\par

## 2020-10-07 NOTE — HISTORY OF PRESENT ILLNESS
[de-identified] : Here for follow up\par \par He states he is very worried about catching covid 19 due to his medical problems\par \par He states he recently saw Endocrine-he thinks it was Dr Obando.  he states he missed follow up appt because he had "congestion" 2 weeks ago.  he feels fine now.  He states he had labs done by Endocrine approx 2 weeks ago\par \par he states FS at home have been good per pt.  \par \par he states he has been out of Januvia x 1 month and states his sugars have been good so he did not continue it\par \par He states he continues to take lisinopril

## 2020-10-07 NOTE — PHYSICAL EXAM
[No Acute Distress] : no acute distress [Well-Appearing] : well-appearing [Normal Voice/Communication] : normal voice/communication [Normal Sclera/Conjunctiva] : normal sclera/conjunctiva [PERRL] : pupils equal round and reactive to light [Normal Oropharynx] : the oropharynx was normal [Normal] : normal rate, regular rhythm, normal S1 and S2 and no murmur heard [No Edema] : there was no peripheral edema [No Extremity Clubbing/Cyanosis] : no extremity clubbing/cyanosis [Soft] : abdomen soft [Non Tender] : non-tender [Non-distended] : non-distended [No Masses] : no abdominal mass palpated [No HSM] : no HSM [Normal Bowel Sounds] : normal bowel sounds [No Rash] : no rash [Normal Affect] : the affect was normal [Alert and Oriented x3] : oriented to person, place, and time [Normal Mood] : the mood was normal [Normal Insight/Judgement] : insight and judgment were intact [No Focal Deficits] : no focal deficits [Comprehensive Foot Exam Normal] : Right and left foot were examined and both feet are normal. No ulcers in either foot. Toes are normal and with full ROM.  Normal tactile sensation with monofilament testing throughout both feet [de-identified] : + 2 DP/PT pulses B/L

## 2020-10-07 NOTE — REVIEW OF SYSTEMS
[Negative] : Gastrointestinal [Fever] : no fever [Chills] : no chills [Fatigue] : no fatigue [Earache] : no earache [Nasal Discharge] : no nasal discharge [Sore Throat] : no sore throat [Chest Pain] : no chest pain [Palpitations] : no palpitations [Lower Ext Edema] : no lower extremity edema [Shortness Of Breath] : no shortness of breath [Wheezing] : no wheezing [Cough] : no cough [Dyspnea on Exertion] : not dyspnea on exertion [Abdominal Pain] : no abdominal pain [Nausea] : no nausea [Diarrhea] : no diarrhea [Vomiting] : no vomiting

## 2020-11-01 ENCOUNTER — RX RENEWAL (OUTPATIENT)
Age: 55
End: 2020-11-01

## 2021-01-07 ENCOUNTER — APPOINTMENT (OUTPATIENT)
Dept: INTERNAL MEDICINE | Facility: CLINIC | Age: 56
End: 2021-01-07

## 2021-03-09 ENCOUNTER — APPOINTMENT (OUTPATIENT)
Dept: INTERNAL MEDICINE | Facility: CLINIC | Age: 56
End: 2021-03-09

## 2021-12-10 ENCOUNTER — APPOINTMENT (OUTPATIENT)
Dept: INTERNAL MEDICINE | Facility: CLINIC | Age: 56
End: 2021-12-10
Payer: MEDICAID

## 2021-12-10 VITALS
OXYGEN SATURATION: 98 % | DIASTOLIC BLOOD PRESSURE: 90 MMHG | BODY MASS INDEX: 24.8 KG/M2 | HEART RATE: 78 BPM | SYSTOLIC BLOOD PRESSURE: 142 MMHG | HEIGHT: 67 IN | RESPIRATION RATE: 16 BRPM | TEMPERATURE: 97.6 F | WEIGHT: 158 LBS

## 2021-12-10 LAB — HBA1C MFR BLD HPLC: 8.5

## 2021-12-10 PROCEDURE — 99214 OFFICE O/P EST MOD 30 MIN: CPT | Mod: 25

## 2021-12-10 PROCEDURE — 83036 HEMOGLOBIN GLYCOSYLATED A1C: CPT | Mod: QW

## 2021-12-10 RX ORDER — SITAGLIPTIN 100 MG/1
100 TABLET, FILM COATED ORAL DAILY
Qty: 90 | Refills: 1 | Status: COMPLETED | COMMUNITY
Start: 2020-02-06 | End: 2021-12-10

## 2021-12-10 RX ORDER — METFORMIN HYDROCHLORIDE 500 MG/1
500 TABLET, COATED ORAL
Qty: 180 | Refills: 1 | Status: DISCONTINUED | COMMUNITY
End: 2021-12-10

## 2021-12-10 RX ORDER — LISINOPRIL 2.5 MG/1
2.5 TABLET ORAL
Qty: 90 | Refills: 1 | Status: COMPLETED | COMMUNITY
Start: 2019-03-03 | End: 2021-12-10

## 2021-12-12 NOTE — HISTORY OF PRESENT ILLNESS
[FreeTextEntry8] : Mr. PAMELA ARTHUR  is    56 year male , is here today for refill of his medications.\par He is a pt. of Dr Miller.\par \par H/O Type 2 DM , he is metformin, stated this morning his FBS was 142,\par HLD on atorvastatin 10 mg .\par Pt. is over all feeling well.\par No change is bowel and bladder habit.\par No trouble sleeping at night.\par \par

## 2021-12-12 NOTE — ASSESSMENT
[FreeTextEntry1] : \par DM type 2: Uncontrolled\par POCT HgA1c 8. 5.\par he is on   metformin 1000mg PO once daily \par I will increase to metformin 500 mg 2 tab twice a day.\par recommended to be compliant on ADA diet .\par \par HLD:\par Refilled atorvastatin 40 mg .\par Low saturated fat diet.\par \par HTN:\par currently not on meds.\par BP is elevated today.\par I will start him on lisinopril 5 mg.\par \par labs ordered , he have a CPE in one month.\par \par \par \par \par \par \par \par \par \par \par

## 2021-12-12 NOTE — REVIEW OF SYSTEMS
[Negative] : Neurological [Fever] : no fever [Chills] : no chills [Fatigue] : no fatigue [Earache] : no earache [Nasal Discharge] : no nasal discharge [Sore Throat] : no sore throat [Chest Pain] : no chest pain [Palpitations] : no palpitations [Lower Ext Edema] : no lower extremity edema [Shortness Of Breath] : no shortness of breath [Wheezing] : no wheezing [Cough] : no cough [Dyspnea on Exertion] : not dyspnea on exertion [Abdominal Pain] : no abdominal pain [Nausea] : no nausea [Diarrhea] : no diarrhea [Vomiting] : no vomiting

## 2021-12-12 NOTE — PHYSICAL EXAM
[Well-Appearing] : well-appearing [Normal Voice/Communication] : normal voice/communication [Normal Sclera/Conjunctiva] : normal sclera/conjunctiva [PERRL] : pupils equal round and reactive to light [Normal Oropharynx] : the oropharynx was normal [Normal] : normal rate, regular rhythm, normal S1 and S2 and no murmur heard [No Edema] : there was no peripheral edema [No Extremity Clubbing/Cyanosis] : no extremity clubbing/cyanosis [Soft] : abdomen soft [Non Tender] : non-tender [Non-distended] : non-distended [No Masses] : no abdominal mass palpated [No HSM] : no HSM [Normal Bowel Sounds] : normal bowel sounds [No Rash] : no rash [No Focal Deficits] : no focal deficits [Normal Affect] : the affect was normal [Alert and Oriented x3] : oriented to person, place, and time [Normal Mood] : the mood was normal [Normal Insight/Judgement] : insight and judgment were intact

## 2022-02-01 ENCOUNTER — APPOINTMENT (OUTPATIENT)
Dept: INTERNAL MEDICINE | Facility: CLINIC | Age: 57
End: 2022-02-01

## 2022-03-01 NOTE — PHYSICAL EXAM
[General Appearance - Alert] : alert [General Appearance - In No Acute Distress] : in no acute distress [Sclera] : the sclera and conjunctiva were normal [PERRL With Normal Accommodation] : pupils were equal in size, round, and reactive to light [Oropharynx] : the oropharynx was normal [Neck Appearance] : the appearance of the neck was normal [Jugular Venous Distention Increased] : there was no jugular-venous distention [Auscultation Breath Sounds / Voice Sounds] : lungs were clear to auscultation bilaterally [Heart Rate And Rhythm] : heart rate was normal and rhythm regular [Heart Sounds] : normal S1 and S2 [Heart Sounds Gallop] : no gallops [Murmurs] : no murmurs [Heart Sounds Pericardial Friction Rub] : no pericardial rub [Edema] : there was no peripheral edema [Bowel Sounds] : normal bowel sounds [Abdomen Soft] : soft [Abdomen Tenderness] : non-tender [Abdomen Mass (___ Cm)] : no abdominal mass palpated [] : no rash [No Focal Deficits] : no focal deficits [Oriented To Time, Place, And Person] : oriented to person, place, and time [Affect] : the affect was normal [Mood] : the mood was normal [FreeTextEntry1] : no calf tenderness engaged in "pill" use but has not done anything since 2019

## 2022-03-21 ENCOUNTER — NON-APPOINTMENT (OUTPATIENT)
Age: 57
End: 2022-03-21

## 2022-03-22 ENCOUNTER — APPOINTMENT (OUTPATIENT)
Dept: INTERNAL MEDICINE | Facility: CLINIC | Age: 57
End: 2022-03-22
Payer: MEDICAID

## 2022-03-22 VITALS
BODY MASS INDEX: 25.9 KG/M2 | RESPIRATION RATE: 15 BRPM | OXYGEN SATURATION: 99 % | DIASTOLIC BLOOD PRESSURE: 80 MMHG | TEMPERATURE: 98.1 F | HEIGHT: 67 IN | WEIGHT: 165 LBS | SYSTOLIC BLOOD PRESSURE: 126 MMHG | HEART RATE: 65 BPM

## 2022-03-22 DIAGNOSIS — Z12.11 ENCOUNTER FOR SCREENING FOR MALIGNANT NEOPLASM OF COLON: ICD-10-CM

## 2022-03-22 DIAGNOSIS — Z13.31 ENCOUNTER FOR SCREENING FOR DEPRESSION: ICD-10-CM

## 2022-03-22 DIAGNOSIS — N53.14 RETROGRADE EJACULATION: ICD-10-CM

## 2022-03-22 DIAGNOSIS — Z23 ENCOUNTER FOR IMMUNIZATION: ICD-10-CM

## 2022-03-22 PROCEDURE — 96127 BRIEF EMOTIONAL/BEHAV ASSMT: CPT

## 2022-03-22 PROCEDURE — G0009: CPT

## 2022-03-22 PROCEDURE — 99214 OFFICE O/P EST MOD 30 MIN: CPT | Mod: 25

## 2022-03-22 PROCEDURE — 90732 PPSV23 VACC 2 YRS+ SUBQ/IM: CPT

## 2022-03-22 RX ORDER — MULTIVIT-MIN/FOLIC/VIT K/LYCOP 400-300MCG
25 MCG TABLET ORAL DAILY
Qty: 90 | Refills: 1 | Status: DISCONTINUED | COMMUNITY
Start: 2019-03-03 | End: 2022-03-22

## 2022-03-23 PROBLEM — Z23 NEED FOR VACCINATION AGAINST STREPTOCOCCUS PNEUMONIAE: Status: ACTIVE | Noted: 2022-03-22

## 2022-03-23 PROBLEM — Z12.11 SCREENING FOR COLON CANCER: Status: ACTIVE | Noted: 2022-03-22

## 2022-03-23 PROBLEM — Z13.31 DEPRESSION SCREENING: Status: ACTIVE | Noted: 2020-10-07

## 2022-03-23 NOTE — HISTORY OF PRESENT ILLNESS
[de-identified] : Here for follow up of DM and hypertension and hyperlipidemia\par \par He wants referral to see urologist because he has retrograde ejaculation.  He states he has had this for about 6 years.\par \par He states his glucose fingerstick at home today was 127\par \par He states overall today he feels well\par \par No chest pain, shortness of breath, palpitations, fever/chills, weight loss, abdominal pain, nausea, vomiting, diarrhea reported

## 2022-03-23 NOTE — ASSESSMENT
[FreeTextEntry1] : \par Retrograde ejaculation\par -I have advised that he see urologist and referral given\par \par DM type 2: Uncontrolled\par -HgA1c 8.5 12/2021\par -He has not had any labs done since 9/2020-I have advised that he needs to have blood work done soon-he states he will go tomorrow\par -He is supposed to be taking Metformin 500 mg 2 tabs twice a day but appears to be only taking Metformin 500 mg 2 tabs once daily\par -Check urine A:C-now on lisinopril\par -Opthalmology -12/2021\par -Foot exam: 3/22/2022\par -He was advised to adhere to a low-fat, low-cholesterol, low carbohydrate diet\par -I have advised he see Endocrine-will refer to Dr Moreno\par \par Hypertension\par -He was recently started on lisinopril by Dr. Sabillon 12/2021\par -Advised patient he needs to have labs done soon as he has had no labs done since 9/2020\par -In the past he did develop hyperkalemia with ACE inhibitor\par \par Glaucoma/ DM retinopathy:\par -followed by ophthalmology-last seen approx 12/2021\par \par Dyslipidemia:\par -on atorvastatin 10 mg daily\par -Check fasting labs\par \par Vitamin D deficiency:\par -will check vitamin D 25-OH\par \par History of elevated LFTS/fatty liver:\par -Check labs\par \par History of low testosterone:\par -Check labs\par \par HCM:\par \par CPE 12/11/2017\par \par Flu shot: Offered 3/22/2022-he states he has developed flulike symptoms last time he had flu so he declines\par \par Pneumovax: Advised.  Risk/benefits discussed.  VIS given.  Pneumovax given today 3/22/2022\par \par Tdap: 11/30/2018\par \par Shingles vaccine: Advised-he will check with insurance to see if covered\par \par Covid vaccine Pfizer x 3\par \par HIV testing: negative 11/2019-offered 3/22/2022 -he declined\par \par Hep C screening: negative 12/2014\par \par Hep B: not immune-advised immunization previously\par \par PSA 0.73 11/2019-will check PSA\par \par Depression screening: 3/22/2022 negative (PHQ 2 score 0)\par \par Colonoscopy:  2016 normal per pt\par \par FIT test kit given to patient today 3/22/2022\par \par \par F/U 3 months.  Fasting labs ordered and he states he will go to lab tomorrow to have done\par

## 2022-03-23 NOTE — HEALTH RISK ASSESSMENT
[0] : 2) Feeling down, depressed, or hopeless: Not at all (0) [PHQ-2 Negative - No further assessment needed] : PHQ-2 Negative - No further assessment needed [[id number]] : 0

## 2022-03-23 NOTE — PHYSICAL EXAM
[Well-Appearing] : well-appearing [Normal Sclera/Conjunctiva] : normal sclera/conjunctiva [Normal Voice/Communication] : normal voice/communication [PERRL] : pupils equal round and reactive to light [Normal Oropharynx] : the oropharynx was normal [Normal] : normal rate, regular rhythm, normal S1 and S2 and no murmur heard [No Edema] : there was no peripheral edema [No Extremity Clubbing/Cyanosis] : no extremity clubbing/cyanosis [Soft] : abdomen soft [Non Tender] : non-tender [Non-distended] : non-distended [No Masses] : no abdominal mass palpated [No HSM] : no HSM [Normal Bowel Sounds] : normal bowel sounds [No Rash] : no rash [No Focal Deficits] : no focal deficits [Normal Affect] : the affect was normal [Alert and Oriented x3] : oriented to person, place, and time [Normal Mood] : the mood was normal [Normal Insight/Judgement] : insight and judgment were intact [Comprehensive Foot Exam Normal] : Right and left foot were examined and both feet are normal. No ulcers in either foot. Toes are normal and with full ROM.  Normal tactile sensation with monofilament testing throughout both feet [No Acute Distress] : no acute distress [Well Developed] : well developed [Well Nourished] : well nourished [No Spinal Tenderness] : no spinal tenderness [de-identified] : + 2 DP/PT pulses B/L

## 2022-03-23 NOTE — REVIEW OF SYSTEMS
[Negative] : Eyes [Fever] : no fever [Chills] : no chills [Fatigue] : no fatigue [Earache] : no earache [Sore Throat] : no sore throat [Nasal Discharge] : no nasal discharge [Chest Pain] : no chest pain [Palpitations] : no palpitations [Lower Ext Edema] : no lower extremity edema [Shortness Of Breath] : no shortness of breath [Wheezing] : no wheezing [Cough] : no cough [Dyspnea on Exertion] : not dyspnea on exertion [Abdominal Pain] : no abdominal pain [Nausea] : no nausea [Diarrhea] : no diarrhea [Vomiting] : no vomiting [FreeTextEntry8] : See HPI

## 2022-04-05 ENCOUNTER — NON-APPOINTMENT (OUTPATIENT)
Age: 57
End: 2022-04-05

## 2022-05-03 ENCOUNTER — FORM ENCOUNTER (OUTPATIENT)
Age: 57
End: 2022-05-03

## 2022-05-24 ENCOUNTER — FORM ENCOUNTER (OUTPATIENT)
Age: 57
End: 2022-05-24

## 2022-06-23 LAB — HBA1C MFR BLD HPLC: 10.1

## 2022-07-21 ENCOUNTER — APPOINTMENT (OUTPATIENT)
Dept: ENDOCRINOLOGY | Facility: CLINIC | Age: 57
End: 2022-07-21

## 2022-07-25 ENCOUNTER — TRANSCRIPTION ENCOUNTER (OUTPATIENT)
Age: 57
End: 2022-07-25

## 2022-07-25 ENCOUNTER — APPOINTMENT (OUTPATIENT)
Dept: INTERNAL MEDICINE | Facility: CLINIC | Age: 57
End: 2022-07-25

## 2022-07-25 ENCOUNTER — NON-APPOINTMENT (OUTPATIENT)
Age: 57
End: 2022-07-25

## 2022-07-25 VITALS
WEIGHT: 160 LBS | OXYGEN SATURATION: 98 % | BODY MASS INDEX: 25.11 KG/M2 | HEIGHT: 67 IN | TEMPERATURE: 97.3 F | RESPIRATION RATE: 15 BRPM | HEART RATE: 70 BPM | SYSTOLIC BLOOD PRESSURE: 132 MMHG | DIASTOLIC BLOOD PRESSURE: 82 MMHG

## 2022-07-25 DIAGNOSIS — E11.319 TYPE 2 DIABETES MELLITUS WITH UNSPECIFIED DIABETIC RETINOPATHY W/OUT MACULAR EDEMA: ICD-10-CM

## 2022-07-25 DIAGNOSIS — I10 ESSENTIAL (PRIMARY) HYPERTENSION: ICD-10-CM

## 2022-07-25 DIAGNOSIS — R80.9 PROTEINURIA, UNSPECIFIED: ICD-10-CM

## 2022-07-25 DIAGNOSIS — E55.9 VITAMIN D DEFICIENCY, UNSPECIFIED: ICD-10-CM

## 2022-07-25 DIAGNOSIS — E78.5 HYPERLIPIDEMIA, UNSPECIFIED: ICD-10-CM

## 2022-07-25 DIAGNOSIS — E11.9 TYPE 2 DIABETES MELLITUS W/OUT COMPLICATIONS: ICD-10-CM

## 2022-07-25 DIAGNOSIS — R79.89 OTHER SPECIFIED ABNORMAL FINDINGS OF BLOOD CHEMISTRY: ICD-10-CM

## 2022-07-25 PROCEDURE — 36415 COLL VENOUS BLD VENIPUNCTURE: CPT

## 2022-07-25 PROCEDURE — 99214 OFFICE O/P EST MOD 30 MIN: CPT | Mod: 25

## 2022-07-25 NOTE — ASSESSMENT
[FreeTextEntry1] : \par Retrograde ejaculation\par -I have again today 7/25/2022 advised that he see urologist and referral given\par \par DM type 2: Uncontrolled\par -HgA1c 10.1 4/2022\par -After last blood test I advised that he increase metformin to 500 mg 2 tablets twice daily but he did not increase medication dose.  He remains on metformin 500 mg twice a day\par -He is supposed to be taking Metformin 500 mg 2 tabs twice a day but appears to be only taking Metformin 500 mg 2 tabs once daily\par - Urine cA:C 510 4/2022 check urine A:C- on lisinopril\par -Opthalmology -12/2021-he states he has appointment 9/2022\par -Foot exam: 3/22/2022\par -He was advised to adhere to a low-fat, low-cholesterol, low carbohydrate diet\par -He was previously referred to endocrine but missed appointment.  He has since rescheduled appointment\par \par Hypertension\par -BP near goal on lisinopril\par \par Glaucoma/ DM retinopathy:\par -followed by ophthalmology-he has follow-up appointment 9/2022\par \par Dyslipidemia:\par -on atorvastatin 10 mg daily\par -Check fasting labs\par \par Vitamin D deficiency:\par -I have advised OTC vitamin D3 1000 units once a day\par \par History of elevated LFTS/fatty liver:\par -4/2022 ALT 52\par -Check labs\par -I previously advised abdominal ultrasound which I again advised today\par \par \par HCM:\par \par CPE 12/11/2017\par \par Flu shot: He declined previously\par \par Pneumovax: 3/22/2022\par \par Tdap: 11/30/2018\par \par Shingles vaccine: Advised-he will check with insurance to see if covered\par \par Covid vaccine Pfizer x 3\par \par HIV testing: negative 11/2019-offered 3/22/2022 -he declined\par \par Hep C screening: negative 12/2014\par \par Hep B: not immune-advised immunization previously\par \par PSA 0.75 4/2022\par \par Depression screening: 3/22/2022 negative (PHQ 2 score 0)\par \par Colonoscopy:  2016 normal per pt\par \par FIT test kit given to patient again today 7/25/2022\par \par \par F/U 3 months.   fasting labs ordered and drawn in office today\par

## 2022-07-25 NOTE — PHYSICAL EXAM
[No Acute Distress] : no acute distress [Well Nourished] : well nourished [Well Developed] : well developed [Well-Appearing] : well-appearing [Normal Voice/Communication] : normal voice/communication [Normal Sclera/Conjunctiva] : normal sclera/conjunctiva [PERRL] : pupils equal round and reactive to light [Normal Oropharynx] : the oropharynx was normal [Normal] : normal rate, regular rhythm, normal S1 and S2 and no murmur heard [No Edema] : there was no peripheral edema [No Extremity Clubbing/Cyanosis] : no extremity clubbing/cyanosis [Soft] : abdomen soft [Non Tender] : non-tender [Non-distended] : non-distended [No Masses] : no abdominal mass palpated [No HSM] : no HSM [Normal Bowel Sounds] : normal bowel sounds [No Rash] : no rash [No Focal Deficits] : no focal deficits [Normal Affect] : the affect was normal [Alert and Oriented x3] : oriented to person, place, and time [Normal Mood] : the mood was normal [Normal Insight/Judgement] : insight and judgment were intact

## 2022-07-25 NOTE — HISTORY OF PRESENT ILLNESS
[de-identified] : Here for follow up of DM\par \par He states he feels well\par \par He did not increase dose of metformin as previously advised.  He states he adjusted diet and states FS at home have improved.  he states he is taking some OTC supplements that are supposed to help with his DM.  he states fasting  glucose readings at home are in the 130s

## 2022-07-26 ENCOUNTER — NON-APPOINTMENT (OUTPATIENT)
Age: 57
End: 2022-07-26

## 2022-07-26 ENCOUNTER — RX RENEWAL (OUTPATIENT)
Age: 57
End: 2022-07-26

## 2022-07-26 LAB
ALBUMIN SERPL ELPH-MCNC: 4 G/DL
ALP BLD-CCNC: 67 U/L
ALT SERPL-CCNC: 30 U/L
ANION GAP SERPL CALC-SCNC: 9 MMOL/L
AST SERPL-CCNC: 23 U/L
BILIRUB SERPL-MCNC: 0.3 MG/DL
BUN SERPL-MCNC: 38 MG/DL
CALCIUM SERPL-MCNC: 9 MG/DL
CHLORIDE SERPL-SCNC: 106 MMOL/L
CHOLEST SERPL-MCNC: 140 MG/DL
CO2 SERPL-SCNC: 24 MMOL/L
CREAT SERPL-MCNC: 1.37 MG/DL
CREAT SPEC-SCNC: 139 MG/DL
EGFR: 61 ML/MIN/1.73M2
ESTIMATED AVERAGE GLUCOSE: 192 MG/DL
GLUCOSE SERPL-MCNC: 108 MG/DL
HBA1C MFR BLD HPLC: 8.3 %
HDLC SERPL-MCNC: 33 MG/DL
LDLC SERPL CALC-MCNC: 59 MG/DL
MICROALBUMIN 24H UR DL<=1MG/L-MCNC: 21.9 MG/DL
MICROALBUMIN/CREAT 24H UR-RTO: 158 MG/G
NONHDLC SERPL-MCNC: 107 MG/DL
POTASSIUM SERPL-SCNC: 6.3 MMOL/L
PROT SERPL-MCNC: 7.1 G/DL
SODIUM SERPL-SCNC: 138 MMOL/L
TRIGL SERPL-MCNC: 236 MG/DL

## 2022-07-26 RX ORDER — LISINOPRIL 5 MG/1
5 TABLET ORAL DAILY
Qty: 90 | Refills: 0 | Status: DISCONTINUED | COMMUNITY
Start: 2021-12-12 | End: 2022-07-26

## 2022-07-26 RX ORDER — METFORMIN HYDROCHLORIDE 500 MG/1
500 TABLET, COATED ORAL TWICE DAILY
Qty: 360 | Refills: 1 | Status: ACTIVE | COMMUNITY
Start: 2021-12-10 | End: 1900-01-01

## 2022-07-29 ENCOUNTER — NON-APPOINTMENT (OUTPATIENT)
Age: 57
End: 2022-07-29

## 2022-09-08 NOTE — HISTORY OF PRESENT ILLNESS
[de-identified] : Here for follow up\par \par He states he is only taking metformin once a day.  He states he is taking Janivia.  HE states when he was taking metformin twice a day and his blood sugar was 86 so he cut it down to\par \par FS at home AM , noon ,  Dinner \par \par He is going to GYM\par \par He states he is getting ophthalmology eye injections for glaucoma and retinopathy
No

## 2022-09-13 ENCOUNTER — FORM ENCOUNTER (OUTPATIENT)
Age: 57
End: 2022-09-13

## 2022-10-31 ENCOUNTER — RX RENEWAL (OUTPATIENT)
Age: 57
End: 2022-10-31

## 2022-10-31 RX ORDER — AMLODIPINE BESYLATE 5 MG/1
5 TABLET ORAL DAILY
Qty: 90 | Refills: 0 | Status: ACTIVE | COMMUNITY
Start: 2022-07-26 | End: 1900-01-01

## 2022-11-09 ENCOUNTER — OFFICE (OUTPATIENT)
Dept: URBAN - METROPOLITAN AREA CLINIC 38 | Facility: CLINIC | Age: 57
Setting detail: OPHTHALMOLOGY
End: 2022-11-09
Payer: MEDICAID

## 2022-11-09 DIAGNOSIS — E11.3513: ICD-10-CM

## 2022-11-09 PROCEDURE — 92134 CPTRZ OPH DX IMG PST SGM RTA: CPT | Performed by: OPHTHALMOLOGY

## 2022-11-09 PROCEDURE — 67028 INJECTION EYE DRUG: CPT | Performed by: OPHTHALMOLOGY

## 2022-11-09 ASSESSMENT — TONOMETRY
OS_IOP_MMHG: 16
OD_IOP_MMHG: 17

## 2022-11-09 ASSESSMENT — SPHEQUIV_DERIVED
OS_SPHEQUIV: 1.125
OS_SPHEQUIV: 1.375
OS_SPHEQUIV: 1.125
OD_SPHEQUIV: 1.375

## 2022-11-09 ASSESSMENT — REFRACTION_MANIFEST
OD_CYLINDER: SPH
OD_ADD: +2.50
OU_VA: 20/30-2
OS_SPHERE: +1.50
OS_ADD: +2.50
OD_SPHERE: +1.50
OS_CYLINDER: -0.75
OD_CYLINDER: SPH
OS_AXIS: 180
OS_CYLINDER: -0.75
OD_SPHERE: +1.50
OD_ADD: +1.75
OD_VA1: 20/40
OS_VA2: 20/20(J1+)
OS_VA2: 20/20(J1+)
OS_VA1: 20/40-
OS_AXIS: 180
OD_VA2: 20/20(J1+)
OD_VA1: 20/40
OS_SPHERE: +1.50
OS_VA1: 20/40-
OU_VA: 20/30-2
OS_ADD: +1.75
OD_VA2: 20/20(J1+)

## 2022-11-09 ASSESSMENT — REFRACTION_CURRENTRX
OS_SPHERE: +2.00
OS_VPRISM_DIRECTION: PROGS
OD_SPHERE: +2.00
OD_AXIS: 0
OD_CYLINDER: PLANO
OD_VPRISM_DIRECTION: PROGS
OS_AXIS: 003
OD_OVR_VA: 20/
OS_VPRISM_DIRECTION: SV
OS_OVR_VA: 20/
OS_ADD: +1.75
OD_ADD: +1.75
OD_SPHERE: +1.50
OS_CYLINDER: -0.50
OS_OVR_VA: 20/
OD_VPRISM_DIRECTION: SV
OD_OVR_VA: 20/
OS_SPHERE: +1.50

## 2022-11-09 ASSESSMENT — REFRACTION_AUTOREFRACTION
OS_SPHERE: +1.75
OS_AXIS: 162
OS_CYLINDER: -0.75
OD_CYLINDER: -0.25
OD_SPHERE: +1.50
OD_AXIS: 06

## 2022-11-09 ASSESSMENT — AXIALLENGTH_DERIVED
OS_AL: 23.8393
OD_AL: 23.8393
OS_AL: 23.9392
OS_AL: 23.9392

## 2022-11-09 ASSESSMENT — KERATOMETRY
OS_K2POWER_DIOPTERS: 41.75
METHOD_AUTO_MANUAL: AUTO
OD_K2POWER_DIOPTERS: 41.75
OS_AXISANGLE_DEGREES: 089
OS_K1POWER_DIOPTERS: 41.00
OD_K1POWER_DIOPTERS: 41.00
OD_AXISANGLE_DEGREES: 090

## 2022-11-09 ASSESSMENT — CONFRONTATIONAL VISUAL FIELD TEST (CVF)
OS_FINDINGS: FULL
OD_FINDINGS: FULL

## 2022-11-09 ASSESSMENT — CORNEAL PTERYGIUM: OS_PTERYGIUM: NASAL 2MM

## 2022-11-09 ASSESSMENT — VISUAL ACUITY
OD_BCVA: 20/25-2
OS_BCVA: 20/25-1

## 2022-11-15 ENCOUNTER — APPOINTMENT (OUTPATIENT)
Dept: INTERNAL MEDICINE | Facility: CLINIC | Age: 57
End: 2022-11-15

## 2022-11-23 ENCOUNTER — APPOINTMENT (OUTPATIENT)
Dept: ENDOCRINOLOGY | Facility: CLINIC | Age: 57
End: 2022-11-23

## 2023-01-17 ENCOUNTER — RX RENEWAL (OUTPATIENT)
Age: 58
End: 2023-01-17

## 2023-02-08 ENCOUNTER — RX RENEWAL (OUTPATIENT)
Age: 58
End: 2023-02-08

## 2023-02-13 ENCOUNTER — RX RENEWAL (OUTPATIENT)
Age: 58
End: 2023-02-13

## 2023-02-14 ENCOUNTER — FORM ENCOUNTER (OUTPATIENT)
Age: 58
End: 2023-02-14

## 2023-02-15 ENCOUNTER — OFFICE (OUTPATIENT)
Dept: URBAN - METROPOLITAN AREA CLINIC 105 | Facility: CLINIC | Age: 58
Setting detail: OPHTHALMOLOGY
End: 2023-02-15
Payer: MEDICAID

## 2023-02-15 DIAGNOSIS — H43.812: ICD-10-CM

## 2023-02-15 DIAGNOSIS — E11.3512: ICD-10-CM

## 2023-02-15 DIAGNOSIS — E11.3511: ICD-10-CM

## 2023-02-15 DIAGNOSIS — H43.11: ICD-10-CM

## 2023-02-15 PROCEDURE — 92134 CPTRZ OPH DX IMG PST SGM RTA: CPT | Performed by: OPHTHALMOLOGY

## 2023-02-15 PROCEDURE — 67028 INJECTION EYE DRUG: CPT | Performed by: OPHTHALMOLOGY

## 2023-02-15 PROCEDURE — 92012 INTRM OPH EXAM EST PATIENT: CPT | Performed by: OPHTHALMOLOGY

## 2023-02-15 ASSESSMENT — REFRACTION_AUTOREFRACTION
OS_CYLINDER: -0.75
OD_SPHERE: +1.50
OS_SPHERE: +1.75
OS_AXIS: 162
OD_CYLINDER: -0.25
OD_AXIS: 06

## 2023-02-15 ASSESSMENT — REFRACTION_CURRENTRX
OD_AXIS: 0
OD_SPHERE: +1.50
OS_OVR_VA: 20/
OS_OVR_VA: 20/
OD_VPRISM_DIRECTION: SV
OS_ADD: +1.75
OD_OVR_VA: 20/
OS_CYLINDER: -0.50
OS_VPRISM_DIRECTION: SV
OS_SPHERE: +1.50
OD_VPRISM_DIRECTION: PROGS
OD_ADD: +1.75
OS_AXIS: 003
OD_CYLINDER: PLANO
OD_OVR_VA: 20/
OD_SPHERE: +2.00
OS_VPRISM_DIRECTION: PROGS
OS_SPHERE: +2.00

## 2023-02-15 ASSESSMENT — KERATOMETRY
OS_K1POWER_DIOPTERS: 41.00
OD_K1POWER_DIOPTERS: 41.00
METHOD_AUTO_MANUAL: AUTO
OS_K2POWER_DIOPTERS: 41.75
OD_AXISANGLE_DEGREES: 090
OD_K2POWER_DIOPTERS: 41.75
OS_AXISANGLE_DEGREES: 089

## 2023-02-15 ASSESSMENT — AXIALLENGTH_DERIVED
OD_AL: 23.8393
OS_AL: 23.8393

## 2023-02-15 ASSESSMENT — SPHEQUIV_DERIVED
OS_SPHEQUIV: 1.375
OD_SPHEQUIV: 1.375

## 2023-02-15 ASSESSMENT — CONFRONTATIONAL VISUAL FIELD TEST (CVF)
OD_FINDINGS: FULL
OS_FINDINGS: FULL

## 2023-02-15 ASSESSMENT — VISUAL ACUITY
OD_BCVA: 20/20-1
OS_BCVA: 20/25-1

## 2023-02-15 ASSESSMENT — CORNEAL PTERYGIUM: OS_PTERYGIUM: NASAL 2MM

## 2023-03-27 ENCOUNTER — OFFICE (OUTPATIENT)
Dept: URBAN - METROPOLITAN AREA CLINIC 105 | Facility: CLINIC | Age: 58
Setting detail: OPHTHALMOLOGY
End: 2023-03-27
Payer: MEDICAID

## 2023-03-27 DIAGNOSIS — E11.3511: ICD-10-CM

## 2023-03-27 DIAGNOSIS — E11.3512: ICD-10-CM

## 2023-03-27 DIAGNOSIS — H43.11: ICD-10-CM

## 2023-03-27 DIAGNOSIS — H43.812: ICD-10-CM

## 2023-03-27 PROCEDURE — 92134 CPTRZ OPH DX IMG PST SGM RTA: CPT | Performed by: OPHTHALMOLOGY

## 2023-03-27 PROCEDURE — 67028 INJECTION EYE DRUG: CPT | Performed by: OPHTHALMOLOGY

## 2023-03-27 ASSESSMENT — CONFRONTATIONAL VISUAL FIELD TEST (CVF)
OD_FINDINGS: FULL
OS_FINDINGS: FULL

## 2023-03-27 ASSESSMENT — CORNEAL PTERYGIUM: OS_PTERYGIUM: NASAL 2MM

## 2023-03-29 ASSESSMENT — REFRACTION_CURRENTRX
OD_ADD: +1.75
OS_SPHERE: +1.50
OD_VPRISM_DIRECTION: SV
OS_ADD: +1.75
OD_SPHERE: +2.00
OD_VPRISM_DIRECTION: PROGS
OD_AXIS: 0
OS_SPHERE: +2.00
OS_VPRISM_DIRECTION: PROGS
OS_OVR_VA: 20/
OS_CYLINDER: -0.50
OD_CYLINDER: PLANO
OD_SPHERE: +1.50
OD_OVR_VA: 20/
OS_OVR_VA: 20/
OD_OVR_VA: 20/
OS_AXIS: 003
OS_VPRISM_DIRECTION: SV

## 2023-03-29 ASSESSMENT — REFRACTION_AUTOREFRACTION
OS_CYLINDER: -0.75
OD_CYLINDER: -0.25
OD_SPHERE: +1.50
OD_AXIS: 06
OS_SPHERE: +1.75
OS_AXIS: 162

## 2023-03-29 ASSESSMENT — KERATOMETRY
OS_K1POWER_DIOPTERS: 41.00
OS_K2POWER_DIOPTERS: 41.75
METHOD_AUTO_MANUAL: AUTO
OD_AXISANGLE_DEGREES: 090
OS_AXISANGLE_DEGREES: 089
OD_K2POWER_DIOPTERS: 41.75
OD_K1POWER_DIOPTERS: 41.00

## 2023-03-29 ASSESSMENT — VISUAL ACUITY
OS_BCVA: 20/30-1
OD_BCVA: 20/25-1

## 2023-03-29 ASSESSMENT — SPHEQUIV_DERIVED
OS_SPHEQUIV: 1.375
OD_SPHEQUIV: 1.375

## 2023-03-29 ASSESSMENT — AXIALLENGTH_DERIVED
OS_AL: 23.8393
OD_AL: 23.8393

## 2023-04-04 ENCOUNTER — RX RENEWAL (OUTPATIENT)
Age: 58
End: 2023-04-04

## 2023-04-24 ENCOUNTER — OFFICE (OUTPATIENT)
Dept: URBAN - METROPOLITAN AREA CLINIC 105 | Facility: CLINIC | Age: 58
Setting detail: OPHTHALMOLOGY
End: 2023-04-24
Payer: MEDICAID

## 2023-04-24 DIAGNOSIS — E11.3512: ICD-10-CM

## 2023-04-24 DIAGNOSIS — E11.3513: ICD-10-CM

## 2023-04-24 DIAGNOSIS — H43.11: ICD-10-CM

## 2023-04-24 DIAGNOSIS — H43.812: ICD-10-CM

## 2023-04-24 PROCEDURE — 67210 TREATMENT OF RETINAL LESION: CPT | Performed by: OPHTHALMOLOGY

## 2023-04-24 PROCEDURE — 92134 CPTRZ OPH DX IMG PST SGM RTA: CPT | Performed by: OPHTHALMOLOGY

## 2023-04-24 ASSESSMENT — REFRACTION_AUTOREFRACTION
OS_AXIS: 162
OD_CYLINDER: -0.25
OS_SPHERE: +1.75
OS_CYLINDER: -0.75
OD_SPHERE: +1.50
OD_AXIS: 06

## 2023-04-24 ASSESSMENT — KERATOMETRY
OD_K1POWER_DIOPTERS: 41.00
OS_K1POWER_DIOPTERS: 41.00
OS_K2POWER_DIOPTERS: 41.75
OD_AXISANGLE_DEGREES: 090
OS_AXISANGLE_DEGREES: 089
METHOD_AUTO_MANUAL: AUTO
OD_K2POWER_DIOPTERS: 41.75

## 2023-04-24 ASSESSMENT — REFRACTION_CURRENTRX
OS_OVR_VA: 20/
OS_AXIS: 003
OS_VPRISM_DIRECTION: PROGS
OD_VPRISM_DIRECTION: PROGS
OD_CYLINDER: PLANO
OD_OVR_VA: 20/
OS_ADD: +1.75
OS_OVR_VA: 20/
OD_ADD: +1.75
OD_AXIS: 0
OS_VPRISM_DIRECTION: SV
OS_SPHERE: +1.50
OD_SPHERE: +2.00
OS_SPHERE: +2.00
OS_CYLINDER: -0.50
OD_OVR_VA: 20/
OD_VPRISM_DIRECTION: SV
OD_SPHERE: +1.50

## 2023-04-24 ASSESSMENT — SPHEQUIV_DERIVED
OD_SPHEQUIV: 1.375
OS_SPHEQUIV: 1.375

## 2023-04-24 ASSESSMENT — CORNEAL PTERYGIUM: OS_PTERYGIUM: NASAL 2MM

## 2023-04-24 ASSESSMENT — VISUAL ACUITY
OS_BCVA: 20/50+2
OD_BCVA: 20/30

## 2023-04-24 ASSESSMENT — CONFRONTATIONAL VISUAL FIELD TEST (CVF)
OD_FINDINGS: FULL
OS_FINDINGS: FULL

## 2023-04-24 ASSESSMENT — AXIALLENGTH_DERIVED
OS_AL: 23.8393
OD_AL: 23.8393

## 2023-05-17 ENCOUNTER — OFFICE (OUTPATIENT)
Dept: URBAN - METROPOLITAN AREA CLINIC 105 | Facility: CLINIC | Age: 58
Setting detail: OPHTHALMOLOGY
End: 2023-05-17
Payer: MEDICAID

## 2023-05-17 DIAGNOSIS — E11.3511: ICD-10-CM

## 2023-05-17 DIAGNOSIS — E11.3513: ICD-10-CM

## 2023-05-17 DIAGNOSIS — H43.812: ICD-10-CM

## 2023-05-17 DIAGNOSIS — H43.11: ICD-10-CM

## 2023-05-17 PROCEDURE — 92134 CPTRZ OPH DX IMG PST SGM RTA: CPT | Performed by: OPHTHALMOLOGY

## 2023-05-17 PROCEDURE — 67210 TREATMENT OF RETINAL LESION: CPT | Performed by: OPHTHALMOLOGY

## 2023-05-17 ASSESSMENT — REFRACTION_AUTOREFRACTION
OS_AXIS: 162
OD_SPHERE: +1.50
OD_CYLINDER: -0.25
OS_SPHERE: +1.75
OD_AXIS: 06
OS_CYLINDER: -0.75

## 2023-05-17 ASSESSMENT — REFRACTION_CURRENTRX
OD_SPHERE: +2.00
OD_AXIS: 0
OS_SPHERE: +2.00
OS_VPRISM_DIRECTION: SV
OS_CYLINDER: -0.50
OS_AXIS: 003
OD_CYLINDER: PLANO
OS_SPHERE: +1.50
OS_OVR_VA: 20/
OD_OVR_VA: 20/
OS_OVR_VA: 20/
OD_VPRISM_DIRECTION: PROGS
OS_VPRISM_DIRECTION: PROGS
OS_ADD: +1.75
OD_OVR_VA: 20/
OD_VPRISM_DIRECTION: SV
OD_ADD: +1.75
OD_SPHERE: +1.50

## 2023-05-17 ASSESSMENT — VISUAL ACUITY
OS_BCVA: 20/30-2
OD_BCVA: 20/30-2

## 2023-05-17 ASSESSMENT — SPHEQUIV_DERIVED
OS_SPHEQUIV: 1.375
OD_SPHEQUIV: 1.375

## 2023-05-17 ASSESSMENT — AXIALLENGTH_DERIVED
OS_AL: 23.8393
OD_AL: 23.8393

## 2023-05-17 ASSESSMENT — KERATOMETRY
OD_K1POWER_DIOPTERS: 41.00
METHOD_AUTO_MANUAL: AUTO
OD_AXISANGLE_DEGREES: 090
OS_K1POWER_DIOPTERS: 41.00
OS_K2POWER_DIOPTERS: 41.75
OS_AXISANGLE_DEGREES: 089
OD_K2POWER_DIOPTERS: 41.75

## 2023-05-17 ASSESSMENT — CORNEAL PTERYGIUM: OS_PTERYGIUM: NASAL 2MM

## 2023-05-17 ASSESSMENT — CONFRONTATIONAL VISUAL FIELD TEST (CVF)
OS_FINDINGS: FULL
OD_FINDINGS: FULL

## 2023-06-26 ENCOUNTER — FORM ENCOUNTER (OUTPATIENT)
Age: 58
End: 2023-06-26

## 2023-06-26 ENCOUNTER — OFFICE (OUTPATIENT)
Dept: URBAN - METROPOLITAN AREA CLINIC 105 | Facility: CLINIC | Age: 58
Setting detail: OPHTHALMOLOGY
End: 2023-06-26
Payer: MEDICAID

## 2023-06-26 DIAGNOSIS — E11.3513: ICD-10-CM

## 2023-06-26 PROCEDURE — 67028 INJECTION EYE DRUG: CPT | Performed by: OPHTHALMOLOGY

## 2023-06-26 PROCEDURE — 92134 CPTRZ OPH DX IMG PST SGM RTA: CPT | Performed by: OPHTHALMOLOGY

## 2023-06-26 ASSESSMENT — KERATOMETRY
OS_AXISANGLE_DEGREES: 089
OS_K1POWER_DIOPTERS: 41.00
OD_K1POWER_DIOPTERS: 41.00
OD_K2POWER_DIOPTERS: 41.75
OD_AXISANGLE_DEGREES: 090
OS_K2POWER_DIOPTERS: 41.75
METHOD_AUTO_MANUAL: AUTO

## 2023-06-26 ASSESSMENT — REFRACTION_AUTOREFRACTION
OD_AXIS: 06
OD_CYLINDER: -0.25
OS_SPHERE: +1.75
OD_SPHERE: +1.50
OS_AXIS: 162
OS_CYLINDER: -0.75

## 2023-06-26 ASSESSMENT — CORNEAL PTERYGIUM: OS_PTERYGIUM: NASAL 2MM

## 2023-06-26 ASSESSMENT — REFRACTION_CURRENTRX
OD_CYLINDER: PLANO
OD_OVR_VA: 20/
OD_VPRISM_DIRECTION: SV
OD_AXIS: 0
OD_ADD: +1.75
OD_SPHERE: +2.00
OD_SPHERE: +1.50
OD_OVR_VA: 20/
OS_VPRISM_DIRECTION: PROGS
OS_OVR_VA: 20/
OS_CYLINDER: -0.50
OS_SPHERE: +1.50
OS_SPHERE: +2.00
OS_ADD: +1.75
OS_OVR_VA: 20/
OS_VPRISM_DIRECTION: SV
OS_AXIS: 003
OD_VPRISM_DIRECTION: PROGS

## 2023-06-26 ASSESSMENT — SPHEQUIV_DERIVED
OS_SPHEQUIV: 1.375
OD_SPHEQUIV: 1.375

## 2023-06-26 ASSESSMENT — TONOMETRY
OD_IOP_MMHG: 17
OS_IOP_MMHG: 20

## 2023-06-26 ASSESSMENT — AXIALLENGTH_DERIVED
OS_AL: 23.8393
OD_AL: 23.8393

## 2023-06-26 ASSESSMENT — VISUAL ACUITY
OD_BCVA: 20/30-
OS_BCVA: 20/30

## 2023-06-26 ASSESSMENT — CONFRONTATIONAL VISUAL FIELD TEST (CVF)
OS_FINDINGS: FULL
OD_FINDINGS: FULL

## 2023-08-14 ENCOUNTER — OFFICE (OUTPATIENT)
Dept: URBAN - METROPOLITAN AREA CLINIC 105 | Facility: CLINIC | Age: 58
Setting detail: OPHTHALMOLOGY
End: 2023-08-14
Payer: MEDICAID

## 2023-08-14 DIAGNOSIS — H43.812: ICD-10-CM

## 2023-08-14 DIAGNOSIS — E11.3511: ICD-10-CM

## 2023-08-14 DIAGNOSIS — E11.3512: ICD-10-CM

## 2023-08-14 DIAGNOSIS — E11.3513: ICD-10-CM

## 2023-08-14 PROCEDURE — 67028 INJECTION EYE DRUG: CPT | Performed by: OPHTHALMOLOGY

## 2023-08-14 PROCEDURE — 92134 CPTRZ OPH DX IMG PST SGM RTA: CPT | Performed by: OPHTHALMOLOGY

## 2023-08-14 ASSESSMENT — REFRACTION_CURRENTRX
OD_SPHERE: +2.00
OD_ADD: +1.75
OD_VPRISM_DIRECTION: SV
OS_OVR_VA: 20/
OD_OVR_VA: 20/
OD_VPRISM_DIRECTION: PROGS
OS_OVR_VA: 20/
OS_AXIS: 003
OD_SPHERE: +1.50
OS_VPRISM_DIRECTION: PROGS
OS_CYLINDER: -0.50
OS_ADD: +1.75
OD_CYLINDER: PLANO
OS_SPHERE: +1.50
OD_OVR_VA: 20/
OS_VPRISM_DIRECTION: SV
OS_SPHERE: +2.00
OD_AXIS: 0

## 2023-08-14 ASSESSMENT — REFRACTION_AUTOREFRACTION
OS_CYLINDER: -0.75
OS_SPHERE: +1.75
OD_AXIS: 06
OS_AXIS: 162
OD_CYLINDER: -0.25
OD_SPHERE: +1.50

## 2023-08-14 ASSESSMENT — KERATOMETRY
METHOD_AUTO_MANUAL: AUTO
OD_K2POWER_DIOPTERS: 41.75
OS_K2POWER_DIOPTERS: 41.75
OS_AXISANGLE_DEGREES: 089
OD_AXISANGLE_DEGREES: 090
OD_K1POWER_DIOPTERS: 41.00
OS_K1POWER_DIOPTERS: 41.00

## 2023-08-14 ASSESSMENT — TONOMETRY
OS_IOP_MMHG: 16
OD_IOP_MMHG: 16

## 2023-08-14 ASSESSMENT — AXIALLENGTH_DERIVED
OD_AL: 23.8393
OS_AL: 23.8393

## 2023-08-14 ASSESSMENT — CONFRONTATIONAL VISUAL FIELD TEST (CVF)
OD_FINDINGS: FULL
OS_FINDINGS: FULL

## 2023-08-14 ASSESSMENT — VISUAL ACUITY
OS_BCVA: 20/50-2
OD_BCVA: 20/60-2

## 2023-08-14 ASSESSMENT — SPHEQUIV_DERIVED
OS_SPHEQUIV: 1.375
OD_SPHEQUIV: 1.375

## 2023-08-14 ASSESSMENT — CORNEAL PTERYGIUM: OS_PTERYGIUM: NASAL 2MM

## 2023-09-20 ENCOUNTER — OFFICE (OUTPATIENT)
Dept: URBAN - METROPOLITAN AREA CLINIC 105 | Facility: CLINIC | Age: 58
Setting detail: OPHTHALMOLOGY
End: 2023-09-20
Payer: MEDICAID

## 2023-09-20 DIAGNOSIS — E11.3511: ICD-10-CM

## 2023-09-20 DIAGNOSIS — E11.3513: ICD-10-CM

## 2023-09-20 DIAGNOSIS — H43.11: ICD-10-CM

## 2023-09-20 DIAGNOSIS — H43.812: ICD-10-CM

## 2023-09-20 PROCEDURE — 92134 CPTRZ OPH DX IMG PST SGM RTA: CPT | Performed by: OPHTHALMOLOGY

## 2023-09-20 PROCEDURE — 67210 TREATMENT OF RETINAL LESION: CPT | Performed by: OPHTHALMOLOGY

## 2023-09-20 ASSESSMENT — REFRACTION_CURRENTRX
OD_OVR_VA: 20/
OD_SPHERE: +1.50
OS_SPHERE: +2.00
OS_OVR_VA: 20/
OD_OVR_VA: 20/
OS_VPRISM_DIRECTION: SV
OD_ADD: +1.75
OD_CYLINDER: PLANO
OS_SPHERE: +1.50
OD_SPHERE: +2.00
OS_OVR_VA: 20/
OS_AXIS: 003
OS_CYLINDER: -0.50
OD_VPRISM_DIRECTION: PROGS
OS_VPRISM_DIRECTION: PROGS
OD_AXIS: 0
OS_ADD: +1.75
OD_VPRISM_DIRECTION: SV

## 2023-09-20 ASSESSMENT — TONOMETRY
OD_IOP_MMHG: 18
OS_IOP_MMHG: 18

## 2023-09-20 ASSESSMENT — CONFRONTATIONAL VISUAL FIELD TEST (CVF)
OS_FINDINGS: FULL
OD_FINDINGS: FULL

## 2023-09-20 ASSESSMENT — SPHEQUIV_DERIVED
OD_SPHEQUIV: 1.375
OS_SPHEQUIV: 1.375

## 2023-09-20 ASSESSMENT — REFRACTION_AUTOREFRACTION
OD_SPHERE: +1.50
OS_CYLINDER: -0.75
OD_CYLINDER: -0.25
OS_AXIS: 162
OD_AXIS: 06
OS_SPHERE: +1.75

## 2023-09-20 ASSESSMENT — KERATOMETRY
OD_AXISANGLE_DEGREES: 090
OS_AXISANGLE_DEGREES: 089
OD_K1POWER_DIOPTERS: 41.00
OS_K2POWER_DIOPTERS: 41.75
OD_K2POWER_DIOPTERS: 41.75
METHOD_AUTO_MANUAL: AUTO
OS_K1POWER_DIOPTERS: 41.00

## 2023-09-20 ASSESSMENT — AXIALLENGTH_DERIVED
OS_AL: 23.8393
OD_AL: 23.8393

## 2023-09-20 ASSESSMENT — VISUAL ACUITY
OD_BCVA: 20/40-2
OS_BCVA: 20/30-2

## 2023-09-20 ASSESSMENT — CORNEAL PTERYGIUM: OS_PTERYGIUM: NASAL 2MM

## 2023-11-13 ENCOUNTER — OFFICE (OUTPATIENT)
Dept: URBAN - METROPOLITAN AREA CLINIC 105 | Facility: CLINIC | Age: 58
Setting detail: OPHTHALMOLOGY
End: 2023-11-13
Payer: MEDICAID

## 2023-11-13 DIAGNOSIS — E11.3512: ICD-10-CM

## 2023-11-13 PROCEDURE — 67210 TREATMENT OF RETINAL LESION: CPT | Mod: 79,LT | Performed by: OPHTHALMOLOGY

## 2023-11-13 ASSESSMENT — REFRACTION_CURRENTRX
OS_VPRISM_DIRECTION: PROGS
OS_SPHERE: +1.50
OS_CYLINDER: -0.50
OD_OVR_VA: 20/
OS_OVR_VA: 20/
OD_AXIS: 0
OD_VPRISM_DIRECTION: PROGS
OD_OVR_VA: 20/
OS_AXIS: 003
OS_SPHERE: +2.00
OD_SPHERE: +1.50
OS_VPRISM_DIRECTION: SV
OS_ADD: +1.75
OD_SPHERE: +2.00
OD_ADD: +1.75
OS_OVR_VA: 20/
OD_VPRISM_DIRECTION: SV
OD_CYLINDER: PLANO

## 2023-11-13 ASSESSMENT — REFRACTION_AUTOREFRACTION
OS_CYLINDER: -0.75
OD_AXIS: 06
OS_AXIS: 162
OD_CYLINDER: -0.25
OD_SPHERE: +1.50
OS_SPHERE: +1.75

## 2023-11-13 ASSESSMENT — CORNEAL PTERYGIUM: OS_PTERYGIUM: NASAL 2MM

## 2023-11-13 ASSESSMENT — CONFRONTATIONAL VISUAL FIELD TEST (CVF)
OD_FINDINGS: FULL
OS_FINDINGS: FULL

## 2023-11-13 ASSESSMENT — SPHEQUIV_DERIVED
OS_SPHEQUIV: 1.375
OD_SPHEQUIV: 1.375

## 2023-12-11 ENCOUNTER — OFFICE (OUTPATIENT)
Dept: URBAN - METROPOLITAN AREA CLINIC 105 | Facility: CLINIC | Age: 58
Setting detail: OPHTHALMOLOGY
End: 2023-12-11
Payer: MEDICAID

## 2023-12-11 DIAGNOSIS — E11.3513: ICD-10-CM

## 2023-12-11 PROBLEM — E11.3511 DM TYPE 2; RIGHT PROLIFERATIVE WITH ME, LEFT PROLIFERATIVE WITH ME: Status: ACTIVE | Noted: 2023-12-11

## 2023-12-11 PROBLEM — E11.3512 DM TYPE 2; RIGHT PROLIFERATIVE WITH ME, LEFT PROLIFERATIVE WITH ME: Status: ACTIVE | Noted: 2023-12-11

## 2023-12-11 PROCEDURE — 67028 INJECTION EYE DRUG: CPT | Mod: 58,50 | Performed by: OPHTHALMOLOGY

## 2023-12-11 PROCEDURE — 92134 CPTRZ OPH DX IMG PST SGM RTA: CPT | Performed by: OPHTHALMOLOGY

## 2023-12-11 ASSESSMENT — REFRACTION_CURRENTRX
OS_VPRISM_DIRECTION: SV
OS_SPHERE: +1.50
OD_CYLINDER: PLANO
OD_OVR_VA: 20/
OD_ADD: +1.75
OS_SPHERE: +2.00
OS_VPRISM_DIRECTION: PROGS
OS_AXIS: 003
OD_SPHERE: +2.00
OD_AXIS: 0
OD_VPRISM_DIRECTION: PROGS
OD_SPHERE: +1.50
OD_OVR_VA: 20/
OS_ADD: +1.75
OS_OVR_VA: 20/
OS_OVR_VA: 20/
OD_VPRISM_DIRECTION: SV
OS_CYLINDER: -0.50

## 2023-12-11 ASSESSMENT — CONFRONTATIONAL VISUAL FIELD TEST (CVF)
OS_FINDINGS: FULL
OD_FINDINGS: FULL

## 2023-12-11 ASSESSMENT — REFRACTION_AUTOREFRACTION
OD_SPHERE: +1.50
OS_AXIS: 162
OD_CYLINDER: -0.25
OS_CYLINDER: -0.75
OD_AXIS: 06
OS_SPHERE: +1.75

## 2023-12-11 ASSESSMENT — CORNEAL PTERYGIUM: OS_PTERYGIUM: NASAL 2MM

## 2023-12-11 ASSESSMENT — SPHEQUIV_DERIVED
OD_SPHEQUIV: 1.375
OS_SPHEQUIV: 1.375

## 2024-01-22 ENCOUNTER — OFFICE (OUTPATIENT)
Dept: URBAN - METROPOLITAN AREA CLINIC 105 | Facility: CLINIC | Age: 59
Setting detail: OPHTHALMOLOGY
End: 2024-01-22
Payer: MEDICAID

## 2024-01-22 DIAGNOSIS — E11.3511: ICD-10-CM

## 2024-01-22 PROCEDURE — 67210 TREATMENT OF RETINAL LESION: CPT | Mod: 79,RT | Performed by: OPHTHALMOLOGY

## 2024-01-22 ASSESSMENT — REFRACTION_AUTOREFRACTION
OS_AXIS: 162
OS_SPHERE: +1.75
OS_CYLINDER: -0.75
OD_AXIS: 06
OD_SPHERE: +1.50
OD_CYLINDER: -0.25

## 2024-01-22 ASSESSMENT — REFRACTION_CURRENTRX
OD_ADD: +1.75
OS_VPRISM_DIRECTION: SV
OS_AXIS: 003
OS_CYLINDER: -0.50
OS_VPRISM_DIRECTION: PROGS
OD_SPHERE: +1.50
OD_CYLINDER: PLANO
OS_OVR_VA: 20/
OS_ADD: +1.75
OD_VPRISM_DIRECTION: PROGS
OD_SPHERE: +2.00
OS_SPHERE: +1.50
OD_AXIS: 0
OS_SPHERE: +2.00
OD_OVR_VA: 20/
OD_OVR_VA: 20/
OD_VPRISM_DIRECTION: SV
OS_OVR_VA: 20/

## 2024-01-22 ASSESSMENT — SPHEQUIV_DERIVED
OS_SPHEQUIV: 1.375
OD_SPHEQUIV: 1.375

## 2024-01-22 ASSESSMENT — CONFRONTATIONAL VISUAL FIELD TEST (CVF)
OD_FINDINGS: FULL
OS_FINDINGS: FULL

## 2024-01-22 ASSESSMENT — CORNEAL PTERYGIUM: OS_PTERYGIUM: NASAL 2MM

## 2024-03-11 ENCOUNTER — OFFICE (OUTPATIENT)
Dept: URBAN - METROPOLITAN AREA CLINIC 105 | Facility: CLINIC | Age: 59
Setting detail: OPHTHALMOLOGY
End: 2024-03-11
Payer: MEDICAID

## 2024-03-11 DIAGNOSIS — E11.3512: ICD-10-CM

## 2024-03-11 DIAGNOSIS — E11.3513: ICD-10-CM

## 2024-03-11 DIAGNOSIS — H43.812: ICD-10-CM

## 2024-03-11 DIAGNOSIS — H43.11: ICD-10-CM

## 2024-03-11 DIAGNOSIS — E11.3511: ICD-10-CM

## 2024-03-11 PROCEDURE — 99024 POSTOP FOLLOW-UP VISIT: CPT | Performed by: OPHTHALMOLOGY

## 2024-03-11 PROCEDURE — 67028 INJECTION EYE DRUG: CPT | Mod: 58,50 | Performed by: OPHTHALMOLOGY

## 2024-03-11 PROCEDURE — 92134 CPTRZ OPH DX IMG PST SGM RTA: CPT | Performed by: OPHTHALMOLOGY

## 2024-03-11 ASSESSMENT — REFRACTION_CURRENTRX
OD_OVR_VA: 20/
OS_OVR_VA: 20/
OD_CYLINDER: PLANO
OS_OVR_VA: 20/
OS_VPRISM_DIRECTION: PROGS
OD_ADD: +1.75
OS_SPHERE: +2.00
OS_AXIS: 003
OS_CYLINDER: -0.50
OS_VPRISM_DIRECTION: SV
OS_ADD: +1.75
OS_SPHERE: +1.50
OD_SPHERE: +1.50
OD_VPRISM_DIRECTION: PROGS
OD_OVR_VA: 20/
OD_SPHERE: +2.00
OD_AXIS: 0
OD_VPRISM_DIRECTION: SV

## 2024-04-17 ENCOUNTER — OFFICE (OUTPATIENT)
Dept: URBAN - METROPOLITAN AREA CLINIC 105 | Facility: CLINIC | Age: 59
Setting detail: OPHTHALMOLOGY
End: 2024-04-17
Payer: MEDICAID

## 2024-04-17 DIAGNOSIS — E11.3512: ICD-10-CM

## 2024-04-17 PROCEDURE — 67210 TREATMENT OF RETINAL LESION: CPT | Mod: 79,LT | Performed by: OPHTHALMOLOGY

## 2024-05-15 ENCOUNTER — OFFICE (OUTPATIENT)
Dept: URBAN - METROPOLITAN AREA CLINIC 105 | Facility: CLINIC | Age: 59
Setting detail: OPHTHALMOLOGY
End: 2024-05-15
Payer: MEDICAID

## 2024-05-15 DIAGNOSIS — E11.3511: ICD-10-CM

## 2024-05-15 DIAGNOSIS — E11.3512: ICD-10-CM

## 2024-05-15 PROBLEM — H43.813 POSTERIOR VITREOUS DETACHMENT;  ,, BOTH EYES: Status: ACTIVE | Noted: 2024-05-15

## 2024-05-15 PROCEDURE — 99024 POSTOP FOLLOW-UP VISIT: CPT | Performed by: OPHTHALMOLOGY

## 2024-05-15 PROCEDURE — 67210 TREATMENT OF RETINAL LESION: CPT | Mod: 79,RT | Performed by: OPHTHALMOLOGY

## 2024-05-15 PROCEDURE — 92134 CPTRZ OPH DX IMG PST SGM RTA: CPT | Performed by: OPHTHALMOLOGY

## 2024-05-15 ASSESSMENT — CONFRONTATIONAL VISUAL FIELD TEST (CVF)
OD_FINDINGS: FULL
OS_FINDINGS: FULL

## 2024-07-22 ENCOUNTER — OFFICE (OUTPATIENT)
Dept: URBAN - METROPOLITAN AREA CLINIC 105 | Facility: CLINIC | Age: 59
Setting detail: OPHTHALMOLOGY
End: 2024-07-22
Payer: MEDICAID

## 2024-07-22 DIAGNOSIS — H43.813: ICD-10-CM

## 2024-07-22 DIAGNOSIS — E11.3512: ICD-10-CM

## 2024-07-22 DIAGNOSIS — E11.3513: ICD-10-CM

## 2024-07-22 DIAGNOSIS — H43.11: ICD-10-CM

## 2024-07-22 PROCEDURE — 67210 TREATMENT OF RETINAL LESION: CPT | Mod: 79,LT | Performed by: OPHTHALMOLOGY

## 2024-07-22 PROCEDURE — 92134 CPTRZ OPH DX IMG PST SGM RTA: CPT | Performed by: OPHTHALMOLOGY

## 2024-07-22 ASSESSMENT — CONFRONTATIONAL VISUAL FIELD TEST (CVF)
OS_FINDINGS: FULL
OD_FINDINGS: FULL

## 2025-01-27 ENCOUNTER — OFFICE (OUTPATIENT)
Dept: URBAN - METROPOLITAN AREA CLINIC 105 | Facility: CLINIC | Age: 60
Setting detail: OPHTHALMOLOGY
End: 2025-01-27
Payer: COMMERCIAL

## 2025-01-27 DIAGNOSIS — H43.813: ICD-10-CM

## 2025-01-27 DIAGNOSIS — E11.3512: ICD-10-CM

## 2025-01-27 DIAGNOSIS — E11.3511: ICD-10-CM

## 2025-01-27 DIAGNOSIS — H43.11: ICD-10-CM

## 2025-01-27 PROCEDURE — 92134 CPTRZ OPH DX IMG PST SGM RTA: CPT | Performed by: OPHTHALMOLOGY

## 2025-01-27 PROCEDURE — 67210 TREATMENT OF RETINAL LESION: CPT | Mod: RT | Performed by: OPHTHALMOLOGY

## 2025-01-27 PROCEDURE — 92014 COMPRE OPH EXAM EST PT 1/>: CPT | Mod: 57 | Performed by: OPHTHALMOLOGY

## 2025-01-27 ASSESSMENT — REFRACTION_CURRENTRX
OS_AXIS: 003
OS_CYLINDER: -0.50
OD_OVR_VA: 20/
OS_OVR_VA: 20/
OS_SPHERE: +2.00
OS_VPRISM_DIRECTION: PROGS
OS_SPHERE: +1.50
OD_SPHERE: +1.50
OD_VPRISM_DIRECTION: PROGS
OS_OVR_VA: 20/
OD_SPHERE: +2.00
OD_OVR_VA: 20/
OD_ADD: +1.75
OS_ADD: +1.75
OD_AXIS: 0
OD_CYLINDER: PLANO
OD_VPRISM_DIRECTION: SV
OS_VPRISM_DIRECTION: SV

## 2025-01-27 ASSESSMENT — REFRACTION_AUTOREFRACTION
OD_SPHERE: +1.50
OS_SPHERE: +1.75
OS_AXIS: 162
OD_CYLINDER: -0.25
OS_CYLINDER: -0.75
OD_AXIS: 06

## 2025-01-27 ASSESSMENT — CONFRONTATIONAL VISUAL FIELD TEST (CVF)
OS_FINDINGS: FULL
OD_FINDINGS: FULL

## 2025-01-27 ASSESSMENT — TONOMETRY
OS_IOP_MMHG: 18
OD_IOP_MMHG: 19

## 2025-01-27 ASSESSMENT — KERATOMETRY
OS_K1POWER_DIOPTERS: 41.00
METHOD_AUTO_MANUAL: AUTO
OD_AXISANGLE_DEGREES: 090
OS_K2POWER_DIOPTERS: 41.75
OS_AXISANGLE_DEGREES: 089
OD_K2POWER_DIOPTERS: 41.75
OD_K1POWER_DIOPTERS: 41.00

## 2025-01-27 ASSESSMENT — CORNEAL PTERYGIUM: OS_PTERYGIUM: NASAL 2MM

## 2025-01-27 ASSESSMENT — VISUAL ACUITY
OD_BCVA: 20/30
OS_BCVA: 20/30-

## 2025-02-10 ENCOUNTER — OFFICE (OUTPATIENT)
Dept: URBAN - METROPOLITAN AREA CLINIC 105 | Facility: CLINIC | Age: 60
Setting detail: OPHTHALMOLOGY
End: 2025-02-10
Payer: COMMERCIAL

## 2025-02-10 DIAGNOSIS — E11.3512: ICD-10-CM

## 2025-02-10 PROCEDURE — 67210 TREATMENT OF RETINAL LESION: CPT | Mod: 79,LT | Performed by: OPHTHALMOLOGY

## 2025-02-10 ASSESSMENT — KERATOMETRY
OS_AXISANGLE_DEGREES: 089
OS_K1POWER_DIOPTERS: 41.00
OD_AXISANGLE_DEGREES: 090
OS_K2POWER_DIOPTERS: 41.75
OD_K2POWER_DIOPTERS: 41.75
OD_K1POWER_DIOPTERS: 41.00
METHOD_AUTO_MANUAL: AUTO

## 2025-02-10 ASSESSMENT — REFRACTION_CURRENTRX
OD_SPHERE: +2.00
OS_OVR_VA: 20/
OD_SPHERE: +1.50
OD_VPRISM_DIRECTION: SV
OS_SPHERE: +1.50
OD_CYLINDER: PLANO
OS_AXIS: 003
OS_SPHERE: +2.00
OD_VPRISM_DIRECTION: PROGS
OS_VPRISM_DIRECTION: PROGS
OS_OVR_VA: 20/
OD_OVR_VA: 20/
OS_ADD: +1.75
OD_AXIS: 0
OS_VPRISM_DIRECTION: SV
OD_OVR_VA: 20/
OS_CYLINDER: -0.50
OD_ADD: +1.75

## 2025-02-10 ASSESSMENT — TONOMETRY
OS_IOP_MMHG: 19
OD_IOP_MMHG: 17

## 2025-02-10 ASSESSMENT — REFRACTION_AUTOREFRACTION
OS_AXIS: 162
OD_CYLINDER: -0.25
OD_SPHERE: +1.50
OD_AXIS: 06
OS_SPHERE: +1.75
OS_CYLINDER: -0.75

## 2025-02-10 ASSESSMENT — CONFRONTATIONAL VISUAL FIELD TEST (CVF)
OD_FINDINGS: FULL
OS_FINDINGS: FULL

## 2025-02-10 ASSESSMENT — VISUAL ACUITY
OD_BCVA: 20/30-1
OS_BCVA: 20/30-1

## 2025-02-10 ASSESSMENT — CORNEAL PTERYGIUM: OS_PTERYGIUM: NASAL 2MM

## 2025-02-24 ENCOUNTER — OFFICE (OUTPATIENT)
Dept: URBAN - METROPOLITAN AREA CLINIC 105 | Facility: CLINIC | Age: 60
Setting detail: OPHTHALMOLOGY
End: 2025-02-24
Payer: COMMERCIAL

## 2025-02-24 DIAGNOSIS — E11.3513: ICD-10-CM

## 2025-02-24 PROCEDURE — 92134 CPTRZ OPH DX IMG PST SGM RTA: CPT | Performed by: OPHTHALMOLOGY

## 2025-02-24 PROCEDURE — 67028 INJECTION EYE DRUG: CPT | Mod: 58,50 | Performed by: OPHTHALMOLOGY

## 2025-02-24 ASSESSMENT — KERATOMETRY
OS_K1POWER_DIOPTERS: 41.00
OS_AXISANGLE_DEGREES: 089
OD_AXISANGLE_DEGREES: 090
OD_K1POWER_DIOPTERS: 41.00
OD_K2POWER_DIOPTERS: 41.75
OS_K2POWER_DIOPTERS: 41.75
METHOD_AUTO_MANUAL: AUTO

## 2025-02-24 ASSESSMENT — REFRACTION_AUTOREFRACTION
OD_AXIS: 06
OD_SPHERE: +1.50
OS_CYLINDER: -0.75
OS_SPHERE: +1.75
OD_CYLINDER: -0.25
OS_AXIS: 162

## 2025-02-24 ASSESSMENT — TONOMETRY
OS_IOP_MMHG: 20
OD_IOP_MMHG: 19

## 2025-02-24 ASSESSMENT — VISUAL ACUITY
OS_BCVA: 20/20-1
OD_BCVA: 20/20-1

## 2025-04-28 ENCOUNTER — OFFICE (OUTPATIENT)
Dept: URBAN - METROPOLITAN AREA CLINIC 105 | Facility: CLINIC | Age: 60
Setting detail: OPHTHALMOLOGY
End: 2025-04-28
Payer: COMMERCIAL

## 2025-04-28 DIAGNOSIS — E11.3513: ICD-10-CM

## 2025-04-28 PROCEDURE — 99024 POSTOP FOLLOW-UP VISIT: CPT | Performed by: OPHTHALMOLOGY

## 2025-04-28 PROCEDURE — 92134 CPTRZ OPH DX IMG PST SGM RTA: CPT | Performed by: OPHTHALMOLOGY

## 2025-04-28 PROCEDURE — 67028 INJECTION EYE DRUG: CPT | Mod: 58,50 | Performed by: OPHTHALMOLOGY

## 2025-04-28 ASSESSMENT — REFRACTION_AUTOREFRACTION
OD_SPHERE: +1.50
OD_CYLINDER: -0.25
OS_CYLINDER: -0.75
OS_SPHERE: +1.75
OS_AXIS: 162
OD_AXIS: 06

## 2025-04-28 ASSESSMENT — KERATOMETRY
OD_K1POWER_DIOPTERS: 41.00
OD_AXISANGLE_DEGREES: 090
OS_K1POWER_DIOPTERS: 41.00
METHOD_AUTO_MANUAL: AUTO
OS_K2POWER_DIOPTERS: 41.75
OD_K2POWER_DIOPTERS: 41.75
OS_AXISANGLE_DEGREES: 089

## 2025-04-28 ASSESSMENT — VISUAL ACUITY
OS_BCVA: 20/20-
OD_BCVA: 20/25-

## 2025-04-28 ASSESSMENT — CONFRONTATIONAL VISUAL FIELD TEST (CVF)
OD_FINDINGS: FULL
OS_FINDINGS: FULL

## 2025-04-28 ASSESSMENT — TONOMETRY
OD_IOP_MMHG: 18
OS_IOP_MMHG: 17

## 2025-06-09 ENCOUNTER — OFFICE (OUTPATIENT)
Dept: URBAN - METROPOLITAN AREA CLINIC 105 | Facility: CLINIC | Age: 60
Setting detail: OPHTHALMOLOGY
End: 2025-06-09
Payer: COMMERCIAL

## 2025-06-09 DIAGNOSIS — E11.3511: ICD-10-CM

## 2025-06-09 PROCEDURE — 67210 TREATMENT OF RETINAL LESION: CPT | Mod: RT | Performed by: OPHTHALMOLOGY

## 2025-06-09 ASSESSMENT — REFRACTION_AUTOREFRACTION
OD_SPHERE: +1.50
OD_CYLINDER: -0.25
OS_AXIS: 162
OD_AXIS: 06
OS_SPHERE: +1.75
OS_CYLINDER: -0.75

## 2025-06-09 ASSESSMENT — TONOMETRY
OD_IOP_MMHG: 18
OS_IOP_MMHG: 16

## 2025-06-09 ASSESSMENT — KERATOMETRY
OS_K1POWER_DIOPTERS: 41.00
OS_AXISANGLE_DEGREES: 089
OD_K1POWER_DIOPTERS: 41.00
OD_K2POWER_DIOPTERS: 41.75
OD_AXISANGLE_DEGREES: 090
OS_K2POWER_DIOPTERS: 41.75
METHOD_AUTO_MANUAL: AUTO

## 2025-06-09 ASSESSMENT — CORNEAL PTERYGIUM: OS_PTERYGIUM: NASAL 2MM

## 2025-06-09 ASSESSMENT — CONFRONTATIONAL VISUAL FIELD TEST (CVF)
OS_FINDINGS: FULL
OD_FINDINGS: FULL

## 2025-06-09 ASSESSMENT — VISUAL ACUITY
OS_BCVA: 20/50+
OD_BCVA: 20/30-

## 2025-06-23 ENCOUNTER — OFFICE (OUTPATIENT)
Dept: URBAN - METROPOLITAN AREA CLINIC 105 | Facility: CLINIC | Age: 60
Setting detail: OPHTHALMOLOGY
End: 2025-06-23
Payer: COMMERCIAL

## 2025-06-23 DIAGNOSIS — E11.3512: ICD-10-CM

## 2025-06-23 PROCEDURE — 67210 TREATMENT OF RETINAL LESION: CPT | Mod: 79,LT | Performed by: OPHTHALMOLOGY

## 2025-06-23 ASSESSMENT — CONFRONTATIONAL VISUAL FIELD TEST (CVF)
OS_FINDINGS: FULL
OD_FINDINGS: FULL

## 2025-06-23 ASSESSMENT — REFRACTION_AUTOREFRACTION
OS_AXIS: 162
OS_SPHERE: +1.75
OD_SPHERE: +1.50
OD_CYLINDER: -0.25
OS_CYLINDER: -0.75
OD_AXIS: 06

## 2025-06-23 ASSESSMENT — VISUAL ACUITY
OD_BCVA: 20/30-
OS_BCVA: 20/40-

## 2025-06-23 ASSESSMENT — KERATOMETRY
OD_K1POWER_DIOPTERS: 41.00
METHOD_AUTO_MANUAL: AUTO
OD_K2POWER_DIOPTERS: 41.75
OD_AXISANGLE_DEGREES: 090
OS_AXISANGLE_DEGREES: 089
OS_K2POWER_DIOPTERS: 41.75
OS_K1POWER_DIOPTERS: 41.00

## 2025-06-23 ASSESSMENT — CORNEAL PTERYGIUM: OS_PTERYGIUM: NASAL 2MM

## 2025-08-20 ENCOUNTER — OFFICE (OUTPATIENT)
Dept: URBAN - METROPOLITAN AREA CLINIC 105 | Facility: CLINIC | Age: 60
Setting detail: OPHTHALMOLOGY
End: 2025-08-20
Payer: COMMERCIAL

## 2025-08-20 DIAGNOSIS — E11.3513: ICD-10-CM

## 2025-08-20 PROCEDURE — 67028 INJECTION EYE DRUG: CPT | Mod: 50,58 | Performed by: OPHTHALMOLOGY

## 2025-08-20 PROCEDURE — 92134 CPTRZ OPH DX IMG PST SGM RTA: CPT | Performed by: OPHTHALMOLOGY

## 2025-08-20 ASSESSMENT — REFRACTION_AUTOREFRACTION
OS_AXIS: 162
OS_SPHERE: +1.75
OD_SPHERE: +1.50
OD_AXIS: 06
OD_CYLINDER: -0.25
OS_CYLINDER: -0.75

## 2025-08-20 ASSESSMENT — KERATOMETRY
OS_AXISANGLE_DEGREES: 089
OD_AXISANGLE_DEGREES: 090
OD_K2POWER_DIOPTERS: 41.75
METHOD_AUTO_MANUAL: AUTO
OS_K2POWER_DIOPTERS: 41.75
OS_K1POWER_DIOPTERS: 41.00
OD_K1POWER_DIOPTERS: 41.00

## 2025-08-20 ASSESSMENT — VISUAL ACUITY
OD_BCVA: 20/40-2
OS_BCVA: 20/40-

## 2025-08-20 ASSESSMENT — TONOMETRY
OS_IOP_MMHG: 16
OD_IOP_MMHG: 15

## 2025-08-20 ASSESSMENT — CONFRONTATIONAL VISUAL FIELD TEST (CVF)
OS_FINDINGS: FULL
OD_FINDINGS: FULL

## 2025-08-20 ASSESSMENT — CORNEAL PTERYGIUM: OS_PTERYGIUM: NASAL 2MM
